# Patient Record
Sex: FEMALE | Race: WHITE | NOT HISPANIC OR LATINO | Employment: FULL TIME | URBAN - NONMETROPOLITAN AREA
[De-identification: names, ages, dates, MRNs, and addresses within clinical notes are randomized per-mention and may not be internally consistent; named-entity substitution may affect disease eponyms.]

---

## 2019-11-15 NOTE — PROGRESS NOTES
Primary Care Provider: Freya Bourne MD  Requesting Provider: Bing Villagran APRN    Chief Complaint:   Chief Complaint   Patient presents with   • Back Pain     Patient is here today for back pain and right leg pain. Patient brought in cd for Adalberto to review.     History of Present Illness  Consultation today at the request of KRYSTINA Khan    HPI:  Sherly Jones is a 54 y.o. female who presents today with a complaint of lumbar back pain.  Previous discectomy at L4-5 and 2004 plan unknown surgeon out of [place].  No recent injury.    Gradual and progressive onset of lumbar back pain for an unknown number of years that has worsened over the past 6 months.  She states her lumbar back pain is constant in nature but waxes and wanes in severity.  Her lumbar back pain alternates laterality, primarily on the right, radiates to the right hip and extends to include the anterior aspect of the right thigh and occasionally the posterior aspect of the right thigh to the knee.  She denies lower extremity weakness, numbness, or paresthesias.  Ms. Vital back pain worsens with prolonged walking and with physical activity and decreases a some extent with stretching, while lying down, application of heat and/or cold, use of a TENS unit, and with ibuprofen and Flexeril.  She does report numbness to the vaginal area but denies a change in sensation to the vagina or rectum with malia-care.  She additionally reports intermittent episodes of having to bear down to void, as well as dysuria, urgency, frequency to void, and chronic constipation.  She denies fevers, chills, night sweats, unexplained weight loss, abdominal pain, bloating, nausea, or vomiting.  She reports a past history of cancer of the cervix at age 21 that was treated with a conization.    Ms. Jones is not completed nor participated in a dedicated course of physician directed physical therapy or been evaluated by pain management.  She does  participate in routine chiropractic and massage care with Dr. Traore out of [place].    Oswestry Disability Index (Gibson et al, 1980)   Score   Pain Intensity 1   Personal Care 2   Lifting 4   Walking 2   Sitting 2   Standing 3   Sleeping 1   Sex Life (if applicable) 0   Social Life 3   Traveling 2   Previous Treatment Yes   TOTAL = Score x2  (or 2.22 if one NA) 40     SCORE INTERPRETATION OF THE OSWESTRY LBP DISABILITY QUESTIONNAIRE   20-40% Moderate disability This group experiences more pain and problems with sitting, lifting, and standing. Travel and social life are more difficult and they may well be off work. Personal care, sexual activity, and sleeping are not grossly affected, and the back condition can usually be managed by conservative means.       40-60% Severe disability Pain remains the main problem in this group of patients, but travel, personal care, social life, sexual activity, and sleep are also affected.  These patients require detailed investigation.     ROS  Review of Systems   Constitutional: Positive for activity change.   HENT: Positive for postnasal drip, sinus pressure, sneezing and tinnitus.    Eyes: Positive for visual disturbance.   Respiratory: Positive for apnea, chest tightness and shortness of breath.    Cardiovascular: Negative.    Gastrointestinal: Positive for constipation.   Endocrine: Positive for cold intolerance, heat intolerance and polyuria.   Genitourinary: Positive for decreased urine volume, difficulty urinating, dysuria, flank pain, frequency, hematuria, pelvic pain and urgency.   Musculoskeletal: Positive for arthralgias, back pain, gait problem, joint swelling, myalgias, neck pain and neck stiffness.   Skin: Negative.    Allergic/Immunologic: Positive for environmental allergies and food allergies.   Neurological: Positive for dizziness and numbness.   Hematological: Negative.    Psychiatric/Behavioral: Positive for agitation, behavioral problems, decreased  "concentration and sleep disturbance.   All other systems reviewed and are negative.    Past Medical History:   Diagnosis Date   • Cancer (CMS/HCC)     cervical cancer (31 years ago   • Overweight (BMI 25.0-29.9)    • Stroke (CMS/HCC)      Past Surgical History:   Procedure Laterality Date   • GASTRIC SLEEVE LAPAROSCOPIC     • HYSTERECTOMY     • LUMBAR DISCECTOMY  2004     Family History: family history is not on file.    Social History:  reports that she has never smoked. She has never used smokeless tobacco. She reports that she does not drink alcohol or use drugs.    Medications:    Current Outpatient Medications:   •  acyclovir (ZOVIRAX) 400 MG tablet, , Disp: , Rfl:   •  albuterol sulfate  (90 Base) MCG/ACT inhaler, , Disp: , Rfl:   •  buPROPion SR (WELLBUTRIN SR) 150 MG 12 hr tablet, bupropion HCl  mg tablet,12 hr sustained-release, Disp: , Rfl:   •  citalopram (CeleXA) 20 MG tablet, Take 20 mg by mouth Daily., Disp: , Rfl:   •  cyclobenzaprine (FLEXERIL) 10 MG tablet, Take 10 mg by mouth., Disp: , Rfl:   •  ondansetron ODT (ZOFRAN-ODT) 4 MG disintegrating tablet, DISSOLVE 1 TABLET IN MOUTH EVERY 8 TO 12 HOURS AS NEEDED, Disp: , Rfl: 1    Allergies:  Prozac  [fluoxetine hcl]    Objective   Ht 167.6 cm (66\")   Wt 77.1 kg (170 lb)   BMI 27.44 kg/m²   Physical Exam   Constitutional: She is oriented to person, place, and time. She appears well-developed and well-nourished. She is cooperative.  Non-toxic appearance. She does not have a sickly appearance. She does not appear ill. No distress.   BMI 27.4   HENT:   Head: Normocephalic and atraumatic.   Right Ear: Hearing normal.   Left Ear: Hearing normal.   Mouth/Throat: Mucous membranes are normal.   Eyes: Conjunctivae and EOM are normal. Pupils are equal, round, and reactive to light.   Neck: Trachea normal and full passive range of motion without pain. Neck supple.   Cardiovascular: Normal rate and regular rhythm.   Pulmonary/Chest: Effort normal. No " accessory muscle usage. No apnea, no tachypnea and no bradypnea. No respiratory distress.   Abdominal: Soft. Normal appearance.   Neurological: She is alert and oriented to person, place, and time. Gait normal. GCS eye subscore is 4. GCS verbal subscore is 5. GCS motor subscore is 6.   Reflex Scores:       Tricep reflexes are 3+ on the right side and 3+ on the left side.       Bicep reflexes are 3+ on the right side and 3+ on the left side.       Brachioradialis reflexes are 3+ on the right side and 3+ on the left side.       Patellar reflexes are 3+ on the right side and 3+ on the left side.       Achilles reflexes are 3+ on the right side and 3+ on the left side.  Skin: Skin is warm, dry and intact.   Psychiatric: She has a normal mood and affect. Her speech is normal and behavior is normal.   Nursing note and vitals reviewed.    Neurologic Exam     Mental Status   Oriented to person, place, and time.   Attention: normal. Concentration: normal.   Speech: speech is normal   Level of consciousness: alert    Cranial Nerves     CN II   Visual fields full to confrontation.     CN III, IV, VI   Pupils are equal, round, and reactive to light.  Extraocular motions are normal.     CN V   Facial sensation intact.     CN VII   Facial expression full, symmetric.     CN VIII   CN VIII normal.     CN IX, X   CN IX normal.     CN XI   CN XI normal.     Motor Exam   Muscle bulk: normal  Overall muscle tone: normal  Right arm tone: normal  Left arm tone: normal  Right arm pronator drift: absent  Left arm pronator drift: absent  Right leg tone: normal  Left leg tone: normal    Strength   Right deltoid: 5/5  Left deltoid: 5/5  Right biceps: 5/5  Left biceps: 5/5  Right triceps: 5/5  Left triceps: 5/5  Right wrist extension: 5/5  Left wrist extension: 5/5  Right iliopsoas: 5/5  Left iliopsoas: 5/5  Right quadriceps: 5/5  Left quadriceps: 5/5  Right anterior tibial: 5/5  Left anterior tibial: 5/5  Right posterior tibial: 5/5  Left  posterior tibial: 5/5  Positive bilateral VEENA, right> left.     Sensory Exam   Right arm light touch: normal  Left arm light touch: normal  Right leg light touch: normal  Left leg light touch: normal    Gait, Coordination, and Reflexes     Gait  Gait: normal    Tremor   Resting tremor: absent  Intention tremor: absent  Action tremor: absent    Reflexes   Right brachioradialis: 3+  Left brachioradialis: 3+  Right biceps: 3+  Left biceps: 3+  Right triceps: 3+  Left triceps: 3+  Right patellar: 3+  Left patellar: 3+  Right achilles: 3+  Left achilles: 3+  Right : 4+  Left : 4+  Right plantar: equivocal  Left plantar: equivocal  Right Perez: present  Left Perez: present  Right ankle clonus: absent  Left ankle clonus: absent  Right pendular knee jerk: absent  Left pendular knee jerk: absent    Imaging: (independent review and interpretation)  11/5/19 MRI of the lumbar spine shows no acute fractures, areas of bone marrow signal change within the vertebral bodies of the lumbar spine, no STIR signal change, the conus ends at normal level, no T2 signal change within the central cord, multiple areas of Schmorl nodes to the upper lumbar and lower thoracic spine, anteriolisthesis of L4 and L5 over S1, multilevel areas of facet arthropathy and ligamentous flavum hypertrophy resulting in central canal and bilateral foraminal narrowing at L3-4, and right foraminal stenosis at L4-5 and L5-S1.              ASSESSMENT and PLAN  Sherly Jones is a 54 y.o. female with a significant medical history of cancer of the cervix at age 21, prior L4-5 discectomy in 2014, stroke, and she is overweight.  She presents with a new problem of constant lumbar back and right thigh pain that worsens with physical activity and resolves with lying down.  Post exam, she additionally reports constant neck stiffness without complaints of upper extremity radicular pain, weakness, numbness, or paresthesias.  Physical exam findings of  global hyperreflexia with bilateral Perez's, positive bilateral VEENA, and equivocal bilateral plantar reflexes.   Her imaging shows no T2 signal change within the central cord, multiple areas of Schmorl nodes to the upper lumbar and lower thoracic spine, anteriolisthesis of L4 and L5 over S1, L5, most likely due to a pars defect, multilevel areas of facet arthropathy, ligamentous flavum hypertrophy, and mild central disc protrusions resulting in central canal and bilateral foraminal narrowing at L3-4, and right foraminal stenosis at L4-5 and L5-S1.  Imaging discussed and reviewed with patient.    TREATMENT RECOMMENDATIONS ...  Lower back and right leg pain  X-rays of the lumbar spine upright and supine  PT/OT, prescription provided to patient  Chiropractic and/or massage as tolerated  Unless contraindicated, Tylenol and ibuprofen or naproxen PRN per package instructions.  B/R/AE and use discussed.    Cervicalgia/hyperreflexia/bilateral Perez's  X-ray of the cervical spine complete with flexion extension to assess for cervical spine instability    Lower urinary tract symptoms with history of cancer of the cervix  CBC, CMP, sed rate, CRP, UA culture if indicated.  We will notify the patient of any abnormal results proceed accordingly.    Overweight  BMI today is 27.4.  Information on the DASH diet provided in the AVS.  We will continue to provided diet and exercise information with the goal of weight loss at each scheduled appointment.     Return for reassessment with me after physical therapy.    I advised the patient to call and return sooner for new or worsening complaints of weakness, paresthesias, gait disturbances, or any additional concerns.  Treatment options discussed in detail with Sherly and she voiced understanding.  Ms. Jones agrees with this plan of care.    Sherly was seen today for back pain.    Diagnoses and all orders for this visit:    Lumbar pain  -     XR Spine Lumbar 2 or 3 View  -      Ambulatory Referral to Physical Therapy Evaluate and treat (3x a week for 6 weeks); Cross Fiber, Soft Tissue Mobilizaton; Stretching, ROM, Strengthening    Right leg pain  -     Ambulatory Referral to Physical Therapy Evaluate and treat (3x a week for 6 weeks); Cross Fiber, Soft Tissue Mobilizaton; Stretching, ROM, Strengthening    Cervicalgia  -     XR spine cervical complete w flex ext; Future    Perez's reflex positive  -     XR spine cervical complete w flex ext; Future    Hyperreflexia  -     XR spine cervical complete w flex ext; Future    Lower urinary tract symptoms (LUTS)  -     CBC & Differential; Future  -     Comprehensive Metabolic Panel; Future  -     C-reactive Protein; Future  -     Sedimentation Rate  -     Urinalysis With Microscopic If Indicated (No Culture) - Urine, Clean Catch; Future    Overweight (BMI 25.0-29.9)    Return for follow up with Adalberto after pt.    Thank you for this Consultation and the opportunity to participate in Sherly's care.    Sincerely,  Adalberto Montaño, APRN    Level of Risk: Moderate due to: undiagnosed new problem  MDM: Moderate Complexity  (Mod = 50631, High = 15014)

## 2019-11-19 ENCOUNTER — OFFICE VISIT (OUTPATIENT)
Dept: NEUROSURGERY | Facility: CLINIC | Age: 54
End: 2019-11-19

## 2019-11-19 ENCOUNTER — LAB (OUTPATIENT)
Dept: LAB | Facility: HOSPITAL | Age: 54
End: 2019-11-19

## 2019-11-19 ENCOUNTER — HOSPITAL ENCOUNTER (OUTPATIENT)
Dept: GENERAL RADIOLOGY | Facility: HOSPITAL | Age: 54
Discharge: HOME | End: 2019-11-19
Admitting: NURSE PRACTITIONER

## 2019-11-19 ENCOUNTER — HOSPITAL ENCOUNTER (OUTPATIENT)
Dept: GENERAL RADIOLOGY | Facility: HOSPITAL | Age: 54
Discharge: HOME | End: 2019-11-19

## 2019-11-19 VITALS — BODY MASS INDEX: 27.32 KG/M2 | WEIGHT: 170 LBS | HEIGHT: 66 IN

## 2019-11-19 DIAGNOSIS — M79.604 RIGHT LEG PAIN: ICD-10-CM

## 2019-11-19 DIAGNOSIS — R29.2 HYPERREFLEXIA: ICD-10-CM

## 2019-11-19 DIAGNOSIS — R29.2: ICD-10-CM

## 2019-11-19 DIAGNOSIS — M54.50 LUMBAR PAIN: ICD-10-CM

## 2019-11-19 DIAGNOSIS — R39.9 LOWER URINARY TRACT SYMPTOMS (LUTS): ICD-10-CM

## 2019-11-19 DIAGNOSIS — M54.50 LUMBAR PAIN: Primary | ICD-10-CM

## 2019-11-19 DIAGNOSIS — E66.3 OVERWEIGHT (BMI 25.0-29.9): ICD-10-CM

## 2019-11-19 DIAGNOSIS — M54.2 CERVICALGIA: ICD-10-CM

## 2019-11-19 PROCEDURE — 72100 X-RAY EXAM L-S SPINE 2/3 VWS: CPT

## 2019-11-19 PROCEDURE — 85652 RBC SED RATE AUTOMATED: CPT | Performed by: NURSE PRACTITIONER

## 2019-11-19 PROCEDURE — 36415 COLL VENOUS BLD VENIPUNCTURE: CPT

## 2019-11-19 PROCEDURE — 99204 OFFICE O/P NEW MOD 45 MIN: CPT | Performed by: NURSE PRACTITIONER

## 2019-11-19 PROCEDURE — 80053 COMPREHEN METABOLIC PANEL: CPT | Performed by: NURSE PRACTITIONER

## 2019-11-19 PROCEDURE — 86140 C-REACTIVE PROTEIN: CPT | Performed by: NURSE PRACTITIONER

## 2019-11-19 PROCEDURE — 81003 URINALYSIS AUTO W/O SCOPE: CPT | Performed by: NURSE PRACTITIONER

## 2019-11-19 PROCEDURE — 85025 COMPLETE CBC W/AUTO DIFF WBC: CPT | Performed by: NURSE PRACTITIONER

## 2019-11-19 PROCEDURE — 72052 X-RAY EXAM NECK SPINE 6/>VWS: CPT

## 2019-11-19 RX ORDER — BUPROPION HYDROCHLORIDE 150 MG/1
150 TABLET, EXTENDED RELEASE ORAL 2 TIMES DAILY
COMMUNITY

## 2019-11-19 RX ORDER — ALBUTEROL SULFATE 90 UG/1
1 AEROSOL, METERED RESPIRATORY (INHALATION) AS NEEDED
COMMUNITY
Start: 2019-10-28

## 2019-11-19 RX ORDER — CYCLOBENZAPRINE HCL 10 MG
10 TABLET ORAL AS NEEDED
COMMUNITY
End: 2023-01-03

## 2019-11-19 RX ORDER — ACYCLOVIR 400 MG/1
400 TABLET ORAL DAILY
COMMUNITY
Start: 2019-10-28

## 2019-11-19 RX ORDER — ONDANSETRON 4 MG/1
4 TABLET, ORALLY DISINTEGRATING ORAL AS NEEDED
Refills: 1 | COMMUNITY
Start: 2019-11-06 | End: 2023-01-03

## 2019-11-19 RX ORDER — CITALOPRAM 20 MG/1
20 TABLET ORAL DAILY
COMMUNITY
End: 2020-04-29 | Stop reason: SDUPTHER

## 2019-11-19 NOTE — PATIENT INSTRUCTIONS
"DASH Eating Plan  DASH stands for \"Dietary Approaches to Stop Hypertension.\" The DASH eating plan is a healthy eating plan that has been shown to reduce high blood pressure (hypertension). It may also reduce your risk for type 2 diabetes, heart disease, and stroke. The DASH eating plan may also help with weight loss.  What are tips for following this plan?    General guidelines  · Avoid eating more than 2,300 mg (milligrams) of salt (sodium) a day. If you have hypertension, you may need to reduce your sodium intake to 1,500 mg a day.  · Limit alcohol intake to no more than 1 drink a day for nonpregnant women and 2 drinks a day for men. One drink equals 12 oz of beer, 5 oz of wine, or 1½ oz of hard liquor.  · Work with your health care provider to maintain a healthy body weight or to lose weight. Ask what an ideal weight is for you.  · Get at least 30 minutes of exercise that causes your heart to beat faster (aerobic exercise) most days of the week. Activities may include walking, swimming, or biking.  · Work with your health care provider or diet and nutrition specialist (dietitian) to adjust your eating plan to your individual calorie needs.  Reading food labels    · Check food labels for the amount of sodium per serving. Choose foods with less than 5 percent of the Daily Value of sodium. Generally, foods with less than 300 mg of sodium per serving fit into this eating plan.  · To find whole grains, look for the word \"whole\" as the first word in the ingredient list.  Shopping  · Buy products labeled as \"low-sodium\" or \"no salt added.\"  · Buy fresh foods. Avoid canned foods and premade or frozen meals.  Cooking  · Avoid adding salt when cooking. Use salt-free seasonings or herbs instead of table salt or sea salt. Check with your health care provider or pharmacist before using salt substitutes.  · Do not rodriguez foods. Cook foods using healthy methods such as baking, boiling, grilling, and broiling instead.  · Cook with " heart-healthy oils, such as olive, canola, soybean, or sunflower oil.  Meal planning  · Eat a balanced diet that includes:  ? 5 or more servings of fruits and vegetables each day. At each meal, try to fill half of your plate with fruits and vegetables.  ? Up to 6-8 servings of whole grains each day.  ? Less than 6 oz of lean meat, poultry, or fish each day. A 3-oz serving of meat is about the same size as a deck of cards. One egg equals 1 oz.  ? 2 servings of low-fat dairy each day.  ? A serving of nuts, seeds, or beans 5 times each week.  ? Heart-healthy fats. Healthy fats called Omega-3 fatty acids are found in foods such as flaxseeds and coldwater fish, like sardines, salmon, and mackerel.  · Limit how much you eat of the following:  ? Canned or prepackaged foods.  ? Food that is high in trans fat, such as fried foods.  ? Food that is high in saturated fat, such as fatty meat.  ? Sweets, desserts, sugary drinks, and other foods with added sugar.  ? Full-fat dairy products.  · Do not salt foods before eating.  · Try to eat at least 2 vegetarian meals each week.  · Eat more home-cooked food and less restaurant, buffet, and fast food.  · When eating at a restaurant, ask that your food be prepared with less salt or no salt, if possible.  What foods are recommended?  The items listed may not be a complete list. Talk with your dietitian about what dietary choices are best for you.  Grains  Whole-grain or whole-wheat bread. Whole-grain or whole-wheat pasta. Brown rice. Oatmeal. Quinoa. Bulgur. Whole-grain and low-sodium cereals. Kate bread. Low-fat, low-sodium crackers. Whole-wheat flour tortillas.  Vegetables  Fresh or frozen vegetables (raw, steamed, roasted, or grilled). Low-sodium or reduced-sodium tomato and vegetable juice. Low-sodium or reduced-sodium tomato sauce and tomato paste. Low-sodium or reduced-sodium canned vegetables.  Fruits  All fresh, dried, or frozen fruit. Canned fruit in natural juice (without  added sugar).  Meat and other protein foods  Skinless chicken or turkey. Ground chicken or turkey. Pork with fat trimmed off. Fish and seafood. Egg whites. Dried beans, peas, or lentils. Unsalted nuts, nut butters, and seeds. Unsalted canned beans. Lean cuts of beef with fat trimmed off. Low-sodium, lean deli meat.  Dairy  Low-fat (1%) or fat-free (skim) milk. Fat-free, low-fat, or reduced-fat cheeses. Nonfat, low-sodium ricotta or cottage cheese. Low-fat or nonfat yogurt. Low-fat, low-sodium cheese.  Fats and oils  Soft margarine without trans fats. Vegetable oil. Low-fat, reduced-fat, or light mayonnaise and salad dressings (reduced-sodium). Canola, safflower, olive, soybean, and sunflower oils. Avocado.  Seasoning and other foods  Herbs. Spices. Seasoning mixes without salt. Unsalted popcorn and pretzels. Fat-free sweets.  What foods are not recommended?  The items listed may not be a complete list. Talk with your dietitian about what dietary choices are best for you.  Grains  Baked goods made with fat, such as croissants, muffins, or some breads. Dry pasta or rice meal packs.  Vegetables  Creamed or fried vegetables. Vegetables in a cheese sauce. Regular canned vegetables (not low-sodium or reduced-sodium). Regular canned tomato sauce and paste (not low-sodium or reduced-sodium). Regular tomato and vegetable juice (not low-sodium or reduced-sodium). Pickles. Olives.  Fruits  Canned fruit in a light or heavy syrup. Fried fruit. Fruit in cream or butter sauce.  Meat and other protein foods  Fatty cuts of meat. Ribs. Fried meat. Harris. Sausage. Bologna and other processed lunch meats. Salami. Fatback. Hotdogs. Bratwurst. Salted nuts and seeds. Canned beans with added salt. Canned or smoked fish. Whole eggs or egg yolks. Chicken or turkey with skin.  Dairy  Whole or 2% milk, cream, and half-and-half. Whole or full-fat cream cheese. Whole-fat or sweetened yogurt. Full-fat cheese. Nondairy creamers. Whipped toppings.  Processed cheese and cheese spreads.  Fats and oils  Butter. Stick margarine. Lard. Shortening. Ghee. Harris fat. Tropical oils, such as coconut, palm kernel, or palm oil.  Seasoning and other foods  Salted popcorn and pretzels. Onion salt, garlic salt, seasoned salt, table salt, and sea salt. Worcestershire sauce. Tartar sauce. Barbecue sauce. Teriyaki sauce. Soy sauce, including reduced-sodium. Steak sauce. Canned and packaged gravies. Fish sauce. Oyster sauce. Cocktail sauce. Horseradish that you find on the shelf. Ketchup. Mustard. Meat flavorings and tenderizers. Bouillon cubes. Hot sauce and Tabasco sauce. Premade or packaged marinades. Premade or packaged taco seasonings. Relishes. Regular salad dressings.  Where to find more information:  · National Heart, Lung, and Blood [place]: www.nhlbi.nih.gov  · American Heart Association: www.heart.org  Summary  · The DASH eating plan is a healthy eating plan that has been shown to reduce high blood pressure (hypertension). It may also reduce your risk for type 2 diabetes, heart disease, and stroke.  · With the DASH eating plan, you should limit salt (sodium) intake to 2,300 mg a day. If you have hypertension, you may need to reduce your sodium intake to 1,500 mg a day.  · When on the DASH eating plan, aim to eat more fresh fruits and vegetables, whole grains, lean proteins, low-fat dairy, and heart-healthy fats.  · Work with your health care provider or diet and nutrition specialist (dietitian) to adjust your eating plan to your individual calorie needs.  This information is not intended to replace advice given to you by your health care provider. Make sure you discuss any questions you have with your health care provider.  Document Released: 12/06/2012 Document Revised: 12/11/2017 Document Reviewed: 12/11/2017  [employer] Patient Education © 2019 [employer]

## 2019-11-20 LAB
ALBUMIN SERPL-MCNC: 4.4 G/DL (ref 3.5–5.2)
ALBUMIN/GLOB SERPL: 1.4 G/DL
ALP SERPL-CCNC: 69 U/L (ref 39–117)
ALT SERPL W P-5'-P-CCNC: 21 U/L (ref 1–33)
ANION GAP SERPL CALCULATED.3IONS-SCNC: 12.8 MMOL/L (ref 5–15)
AST SERPL-CCNC: 19 U/L (ref 1–32)
BASOPHILS # BLD AUTO: 0.06 10*3/MM3 (ref 0–0.2)
BASOPHILS NFR BLD AUTO: 0.6 % (ref 0–1.5)
BILIRUB SERPL-MCNC: 0.7 MG/DL (ref 0.2–1.2)
BILIRUB UR QL STRIP: NEGATIVE
BUN BLD-MCNC: 15 MG/DL (ref 6–20)
BUN/CREAT SERPL: 18.8 (ref 7–25)
CALCIUM SPEC-SCNC: 9.9 MG/DL (ref 8.6–10.5)
CHLORIDE SERPL-SCNC: 103 MMOL/L (ref 98–107)
CLARITY UR: CLEAR
CO2 SERPL-SCNC: 28.2 MMOL/L (ref 22–29)
COLOR UR: YELLOW
CREAT BLD-MCNC: 0.8 MG/DL (ref 0.57–1)
CRP SERPL-MCNC: 0.04 MG/DL (ref 0–0.5)
DEPRECATED RDW RBC AUTO: 43.7 FL (ref 37–54)
EOSINOPHIL # BLD AUTO: 0.28 10*3/MM3 (ref 0–0.4)
EOSINOPHIL NFR BLD AUTO: 2.7 % (ref 0.3–6.2)
ERYTHROCYTE [DISTWIDTH] IN BLOOD BY AUTOMATED COUNT: 13.1 % (ref 12.3–15.4)
ERYTHROCYTE [SEDIMENTATION RATE] IN BLOOD: 4 MM/HR (ref 0–30)
GFR SERPL CREATININE-BSD FRML MDRD: 75 ML/MIN/1.73
GLOBULIN UR ELPH-MCNC: 3.2 GM/DL
GLUCOSE BLD-MCNC: 104 MG/DL (ref 65–99)
GLUCOSE UR STRIP-MCNC: NEGATIVE MG/DL
HCT VFR BLD AUTO: 42.9 % (ref 34–46.6)
HGB BLD-MCNC: 14.3 G/DL (ref 12–15.9)
HGB UR QL STRIP.AUTO: NEGATIVE
IMM GRANULOCYTES # BLD AUTO: 0.04 10*3/MM3 (ref 0–0.05)
IMM GRANULOCYTES NFR BLD AUTO: 0.4 % (ref 0–0.5)
KETONES UR QL STRIP: NEGATIVE
LEUKOCYTE ESTERASE UR QL STRIP.AUTO: NEGATIVE
LYMPHOCYTES # BLD AUTO: 3.25 10*3/MM3 (ref 0.7–3.1)
LYMPHOCYTES NFR BLD AUTO: 30.9 % (ref 19.6–45.3)
MCH RBC QN AUTO: 30 PG (ref 26.6–33)
MCHC RBC AUTO-ENTMCNC: 33.3 G/DL (ref 31.5–35.7)
MCV RBC AUTO: 89.9 FL (ref 79–97)
MONOCYTES # BLD AUTO: 0.72 10*3/MM3 (ref 0.1–0.9)
MONOCYTES NFR BLD AUTO: 6.9 % (ref 5–12)
NEUTROPHILS # BLD AUTO: 6.16 10*3/MM3 (ref 1.7–7)
NEUTROPHILS NFR BLD AUTO: 58.5 % (ref 42.7–76)
NITRITE UR QL STRIP: NEGATIVE
NRBC BLD AUTO-RTO: 0 /100 WBC (ref 0–0.2)
PH UR STRIP.AUTO: <=5 [PH] (ref 5–8)
PLATELET # BLD AUTO: 267 10*3/MM3 (ref 140–450)
PMV BLD AUTO: 10.3 FL (ref 6–12)
POTASSIUM BLD-SCNC: 4.1 MMOL/L (ref 3.5–5.2)
PROT SERPL-MCNC: 7.6 G/DL (ref 6–8.5)
PROT UR QL STRIP: ABNORMAL
RBC # BLD AUTO: 4.77 10*6/MM3 (ref 3.77–5.28)
SODIUM BLD-SCNC: 144 MMOL/L (ref 136–145)
SP GR UR STRIP: 1.02 (ref 1–1.03)
UROBILINOGEN UR QL STRIP: ABNORMAL
WBC NRBC COR # BLD: 10.51 10*3/MM3 (ref 3.4–10.8)

## 2020-02-13 ENCOUNTER — OFFICE VISIT (OUTPATIENT)
Dept: NEUROSURGERY | Facility: CLINIC | Age: 55
End: 2020-02-13

## 2020-02-13 VITALS — WEIGHT: 170 LBS | BODY MASS INDEX: 27.32 KG/M2 | HEIGHT: 66 IN

## 2020-02-13 DIAGNOSIS — R29.2: ICD-10-CM

## 2020-02-13 DIAGNOSIS — E66.3 OVERWEIGHT (BMI 25.0-29.9): ICD-10-CM

## 2020-02-13 DIAGNOSIS — M54.2 CERVICALGIA: Primary | ICD-10-CM

## 2020-02-13 DIAGNOSIS — R29.2 HYPERREFLEXIA: ICD-10-CM

## 2020-02-13 DIAGNOSIS — M54.50 LUMBAR PAIN: ICD-10-CM

## 2020-02-13 PROCEDURE — 99213 OFFICE O/P EST LOW 20 MIN: CPT | Performed by: NURSE PRACTITIONER

## 2020-02-13 RX ORDER — ACYCLOVIR 400 MG/1
TABLET ORAL
COMMUNITY
End: 2020-04-29 | Stop reason: SDUPTHER

## 2020-02-13 RX ORDER — ALBUTEROL SULFATE 90 UG/1
AEROSOL, METERED RESPIRATORY (INHALATION)
COMMUNITY
Start: 2012-12-06 | End: 2020-04-29 | Stop reason: SDUPTHER

## 2020-02-13 RX ORDER — CITALOPRAM 40 MG/1
40 TABLET ORAL DAILY
COMMUNITY

## 2020-02-13 RX ORDER — CYCLOBENZAPRINE HCL 10 MG
TABLET ORAL
COMMUNITY
Start: 2012-11-27 | End: 2020-04-29 | Stop reason: SDUPTHER

## 2020-02-13 RX ORDER — FEXOFENADINE HCL AND PSEUDOEPHEDRINE HCI 60; 120 MG/1; MG/1
1 TABLET, EXTENDED RELEASE ORAL AS NEEDED
COMMUNITY
Start: 2014-02-01 | End: 2023-01-03

## 2020-02-13 RX ORDER — BUPROPION HYDROCHLORIDE 150 MG/1
TABLET, EXTENDED RELEASE ORAL
COMMUNITY
Start: 2013-07-08 | End: 2020-04-29 | Stop reason: SDUPTHER

## 2020-02-13 NOTE — PROGRESS NOTES
Chief complaint:   Chief Complaint   Patient presents with   • Back Pain     Patient is here toay for back pain and neck pain.     Subjective     HPI:   From previous note: 11/19/19.  Sherly Jones is a 54 y.o. female with a significant medical history of cancer of the cervix at age 21, prior L4-5 discectomy in 2014, stroke, and she is overweight.  She presents with a new problem of constant lumbar back and right thigh pain that worsens with physical activity and resolves with lying down.  Post exam, she additionally reports constant neck stiffness without complaints of upper extremity radicular pain, weakness, numbness, or paresthesias.  Physical exam findings of global hyperreflexia with bilateral Perez's, positive bilateral VEENA, and equivocal bilateral plantar reflexes.   Her imaging shows no T2 signal change within the central cord, multiple areas of Schmorl nodes to the upper lumbar and lower thoracic spine, anteriolisthesis of L4 and L5 over S1, L5, most likely due to a pars defect, multilevel areas of facet arthropathy, ligamentous flavum hypertrophy, and mild central disc protrusions resulting in central canal and bilateral foraminal narrowing at L3-4, and right foraminal stenosis at L4-5 and L5-S1.  Imaging discussed and reviewed with patient.     TREATMENT RECOMMENDATIONS ...  Lower back and right leg pain  X-rays of the lumbar spine upright and supine  PT/OT, prescription provided to patient  Chiropractic and/or massage as tolerated  Unless contraindicated, Tylenol and ibuprofen or naproxen PRN per package instructions.  B/R/AE and use discussed.     Cervicalgia/hyperreflexia/bilateral Perez's  X-ray of the cervical spine complete with flexion extension to assess for cervical spine instability     Lower urinary tract symptoms with history of cancer of the cervix  CBC, CMP, sed rate, CRP, UA culture if indicated.  We will notify the patient of any abnormal results proceed  "accordingly.     Overweight  BMI today is 27.4.  Information on the DASH diet provided in the AVS.  We will continue to provided diet and exercise information with the goal of weight loss at each scheduled appointment.     Interval History: Sherly Jones is a 54 y.o.  female who presents today for for follow-up of neck and lumbar back pain.  Ms. Jones recently completed a dedicated course of physician directed physical therapy February 10, 2020 which she reports has been beneficial in treating her discomfort.    She currently reports constant non-radiating cervical \"tension\".  She denies upper extremity radicular pain, numbness, or paresthesias.  She does however report generalized upper extremity weakness and frequently dropped items.  She states her neck discomfort worsens during times of stress and decreases to some extent with stretching.    In regards to her lumbar back pain, she states her back discomfort has significantly improved with physical therapy.  She continues to report intermittent mid lower lumbar pain that radiates to the posterior hips bilaterally.  She currently denies lower extremity radicular pain, weakness, numbness, or paresthesias.  She states her back pain worsens with walking, twisting, and with physical exertion.  Alleviating factors include rest and stretching.  She denies fevers, chills, night sweats, unexplained weight loss, saddle anesthesia, or bowel or bladder dysfunction.  Currently, she rates the severity of her symptoms 0/10.  No additional concerns at this time.    Oswestry Disability Index ([place] et al, 1980)   Score   Pain Intensity 2   Personal Care 0   Lifting 2   Reading 2   Headaches 2   Concentration 4   Work 1   Driving 2   Sleeping 1   Recreation 1   TOTAL =  17     SCORE INTERPRETATION OF THE OSWESTRY NECK DISABILITY QUESTIONNAIRE  0-4: No disability  5-14: Mild disability   15-24: Moderate disability   25-34: Severe disability   >35: Complete " disability    Oswestry Disability Index ([place] et al, 1980)   Score   Pain Intensity 0   Personal Care 0   Lifting 2   Walking 1   Sitting 1   Standing 2   Sleeping 0   Sex Life (if applicable) 0   Social Life 2   Traveling 0   Previous Treatment Yes   TOTAL = Score x2  (or 2.22 if one NA) 16     SCORE INTERPRETATION OF THE OSWESTRY LBP DISABILITY QUESTIONNAIRE   0-20% Minimal disability Can cope with most ADLs. Usually no treatment is needed, apart from advice on lifting, sitting, posture, physical fitness, and diet. In this group, some patients have particular difficulty with sitting and this may be important if their occupation is sedentary (, , etc.)       ROS  Review of Systems   HENT: Negative.    Eyes: Negative.    Respiratory: Negative.    Cardiovascular: Negative.    Gastrointestinal: Negative.    Endocrine: Negative.    Genitourinary: Negative.    Musculoskeletal: Positive for back pain, neck pain and neck stiffness.   Skin: Negative.    Allergic/Immunologic: Negative.    Neurological: Positive for weakness.   Hematological: Negative.    Psychiatric/Behavioral: Negative.    All other systems reviewed and are negative.    PFSH:  Past Medical History:   Diagnosis Date   • Cancer (CMS/HCC)     cervical cancer (31 years ago   • Overweight (BMI 25.0-29.9)    • Stroke (CMS/HCC)      Past Surgical History:   Procedure Laterality Date   • GASTRIC SLEEVE LAPAROSCOPIC     • HYSTERECTOMY     • LUMBAR DISCECTOMY  2004     Objective      Current Outpatient Medications   Medication Sig Dispense Refill   • acyclovir (ZOVIRAX) 400 MG tablet      • albuterol sulfate HFA (PROAIR HFA) 108 (90 Base) MCG/ACT inhaler ProAir HFA 90 mcg/actuation aerosol inhaler     • buPROPion SR (WELLBUTRIN SR) 150 MG 12 hr tablet bupropion HCl  mg tablet,12 hr sustained-release     • citalopram (CeleXA) 40 MG tablet citalopram 40 mg tablet     • cyclobenzaprine (FLEXERIL) 10 MG tablet Take 10 mg by mouth.     •  "cyclobenzaprine (FLEXERIL) 10 MG tablet TAKE 1 TABLET BY MOUTH THREE TIMES DAILY AS NEEDED FOR MUSCLE SPASMS     • fexofenadine-pseudoephedrine (ALLEGRA-D)  MG per 12 hr tablet TAKE ONE TABLET BY MOUTH TWICE DAILY     • ondansetron ODT (ZOFRAN-ODT) 4 MG disintegrating tablet DISSOLVE 1 TABLET IN MOUTH EVERY 8 TO 12 HOURS AS NEEDED  1   • acyclovir (ZOVIRAX) 400 MG tablet acyclovir 400 mg tablet     • albuterol sulfate  (90 Base) MCG/ACT inhaler      • buPROPion SR (WELLBUTRIN SR) 150 MG 12 hr tablet bupropion HCl  mg tablet,12 hr sustained-release     • citalopram (CeleXA) 20 MG tablet Take 20 mg by mouth Daily.       No current facility-administered medications for this visit.      Vital Signs  Ht 167.6 cm (66\")   Wt 77.1 kg (170 lb)   BMI 27.44 kg/m²   Physical Exam   Constitutional: She is oriented to person, place, and time. She appears well-developed and well-nourished. She is cooperative.  Non-toxic appearance. She does not have a sickly appearance. She does not appear ill. No distress.   BMI 27.4   HENT:   Head: Normocephalic and atraumatic.   Right Ear: Hearing normal.   Left Ear: Hearing normal.   Mouth/Throat: Mucous membranes are normal.   Eyes: Pupils are equal, round, and reactive to light. Conjunctivae and EOM are normal.   Neck: Trachea normal and full passive range of motion without pain. Neck supple.   Cardiovascular: Normal rate and regular rhythm.   Pulmonary/Chest: Effort normal. No accessory muscle usage. No apnea, no tachypnea and no bradypnea. No respiratory distress.   Abdominal: Soft. Normal appearance.   Neurological: She is alert and oriented to person, place, and time. Gait normal. GCS eye subscore is 4. GCS verbal subscore is 5. GCS motor subscore is 6.   Reflex Scores:       Tricep reflexes are 3+ on the right side and 3+ on the left side.       Bicep reflexes are 3+ on the right side and 3+ on the left side.       Brachioradialis reflexes are 3+ on the right side " and 3+ on the left side.       Patellar reflexes are 3+ on the right side and 3+ on the left side.       Achilles reflexes are 3+ on the right side and 3+ on the left side.  Skin: Skin is warm, dry and intact.   Psychiatric: She has a normal mood and affect. Her speech is normal and behavior is normal.   Nursing note and vitals reviewed.    Neurologic Exam     Mental Status   Oriented to person, place, and time.   Attention: normal. Concentration: normal.   Speech: speech is normal   Level of consciousness: alert    Cranial Nerves     CN II   Visual fields full to confrontation.     CN III, IV, VI   Pupils are equal, round, and reactive to light.  Extraocular motions are normal.     CN V   Facial sensation intact.     CN VII   Facial expression full, symmetric.     CN VIII   CN VIII normal.     CN IX, X   CN IX normal.     CN XI   CN XI normal.     Motor Exam   Muscle bulk: normal  Overall muscle tone: normal  Right arm tone: normal  Left arm tone: normal  Right arm pronator drift: absent  Left arm pronator drift: absent  Right leg tone: normal  Left leg tone: normal    Strength   Right deltoid: 5/5  Left deltoid: 5/5  Right biceps: 5/5  Left biceps: 5/5  Right triceps: 5/5  Left triceps: 5/5  Right wrist extension: 5/5  Left wrist extension: 5/5  Right iliopsoas: 5/5  Left iliopsoas: 5/5  Right quadriceps: 5/5  Left quadriceps: 5/5  Right anterior tibial: 5/5  Left anterior tibial: 5/5  Right posterior tibial: 5/5  Left posterior tibial: 5/5    Sensory Exam   Right arm light touch: normal  Left arm light touch: normal  Right leg light touch: normal  Left leg light touch: normal    Gait, Coordination, and Reflexes     Gait  Gait: normal    Tremor   Resting tremor: absent  Intention tremor: absent  Action tremor: absent    Reflexes   Right brachioradialis: 3+  Left brachioradialis: 3+  Right biceps: 3+  Left biceps: 3+  Right triceps: 3+  Left triceps: 3+  Right patellar: 3+  Left patellar: 3+  Right achilles:  3+  Left achilles: 3+  Right plantar: equivocal  Left plantar: equivocal  Right Perez: present  Left Perez: present  Right ankle clonus: absent  Left ankle clonus: absent  Right pendular knee jerk: absent  Left pendular knee jerk: absent  (12 bullet pts)    Male  strength (pounds)  AGE Right Hand RH Norms Left Hand LH Norms   20-24  121+20.6  104+21.8   25-29  120+23.0  110+16.2   30-34  121+22.4  110+21.7   35-39  119+24  113+21.7   40-44  117+20.7  112+18.7   45-49  110+23.0  101+22.8   50-54 60* 113+18.1 50 102+17   55-59  101+26.7  83+23.4   60-64  90+20.4  77+20.3   65-69  91+20.6  76.8+19.8   70-74  75+21.5  65+18.1   75+  66+21.0  55+17.0   (DALE Michele et al; Hand Dynometer: Effects of trials and sessions.  Perpetual and Motor Skills 61:195-8, 1985)    Results Review:   11/19/19 x-ray of the cervical spine shows no acute fractures, straightening of the normal cervical lordosis, anteriolisthesis of C3 over C4 that resolves with extension, retrolisthesis of C5 over C6 that appears to resolve with extension.        11/5/19 MRI of the lumbar spine shows no acute fractures, areas of bone marrow signal change within the vertebral bodies of the lumbar spine, no STIR signal change, the conus ends at normal level, no T2 signal change within the central cord, multiple areas of Schmorl nodes to the upper lumbar and lower thoracic spine, anteriolisthesis of L4 over L5 and L5 over S1, multilevel areas of facet arthropathy and ligamentous flavum hypertrophy resulting in central canal and bilateral foraminal narrowing at L3-4, and right foraminal stenosis at L4-5 and L5-S1.           11/19/19        Assessment/Plan: Sherly Jones is a 54 y.o. female with a significant medical history of cancer of the cervix at age 21, prior L4-5 discectomy in 2014, stroke, and she is overweight.  She presents today for follow-up of both neck and lumbar back pain without complaints of significant upper or lower extremity radicular  pain, numbness, or paresthesias.  Physical exam findings of bilateral   strength weakness, slightly worse on the left, 3-4 standard deviations below age mean; global hyperreflexia with bilateral Perez's; and equivocal bilateral plantar reflexes.  BIGG: Neck 17; back 16.  Her imaging: x-ray of the cervical spine shows no acute fractures, straightening of the normal cervical lordosis, anteriolisthesis of C3 over C4 that resolves with extension, retrolisthesis of C5 over C6 that appears to resolve with extension.  MRI of the lumbar spine shows an anteriolisthesis of L4 over L5 and L5 over S1, multilevel areas of facet arthropathy and ligamentous flavum hypertrophy resulting in central canal and bilateral foraminal narrowing at L3-4, and right foraminal stenosis at L4-5 and L5-S1.    1. Cervicalgia    2. Perez's reflex positive    3. Hyperreflexia    4. Lumbar pain    5. Overweight (BMI 25.0-29.9)      Recommendations:  Cervicalgia  Hyperreflexia  Bilateral Perez's  Per Ms. Jones's request, we will continue PT/OT, prescription provided to patient  Chiropractic and/or massage as tolerated  Unless contraindicated, Tylenol and ibuprofen or naproxen PRN per package instructions.  B/R/AE and use discussed.  MRI cervical spine for further evaluation of hyperreflexia and to rule out need for surgical surgical intervention.    Lumbar back pain  Chiropractic and/or massage as tolerated  Unless contraindicated, Tylenol and ibuprofen or naproxen PRN per package instructions.  B/R/AE and use discussed.     Lower urinary tract symptoms with history of cancer of the cervix  Lower urinary tract symptoms have resolved.  Labs: CBC, CMP, sed rate, CRP, and UA  were unremarkable.     Overweight: 25.0-29.9kg/m2   Body mass index is 27.44 kg/m².  Information on the DASH diet provided in the AVS.  We will continue to provided diet and exercise information with the goal of weight loss at each scheduled appointment.     Sherly was  seen today for back pain.    Diagnoses and all orders for this visit:    Cervicalgia  -     MRI Cervical Spine Without Contrast; Future  -     Ambulatory Referral to Physical Therapy Evaluate and treat (3x a week for 6 weeks); Cross Fiber, Soft Tissue Mobilizaton; ROM, Strengthening, Stretching    Perez's reflex positive    Hyperreflexia    Lumbar pain    Overweight (BMI 25.0-29.9)      Return for Follow up with Dr Alavrado next available.    Level of Risk: Moderate due to: undiagnosed new problem  MDM: Moderate Complexity  (Mod = 11760, High = 33112)    Thank you, for allowing me to continue to participate in the care of this patient.    Sincerely,  KRYSTINA Kern

## 2020-02-13 NOTE — PATIENT INSTRUCTIONS
"DASH Eating Plan  DASH stands for \"Dietary Approaches to Stop Hypertension.\" The DASH eating plan is a healthy eating plan that has been shown to reduce high blood pressure (hypertension). It may also reduce your risk for type 2 diabetes, heart disease, and stroke. The DASH eating plan may also help with weight loss.  What are tips for following this plan?    General guidelines  · Avoid eating more than 2,300 mg (milligrams) of salt (sodium) a day. If you have hypertension, you may need to reduce your sodium intake to 1,500 mg a day.  · Limit alcohol intake to no more than 1 drink a day for nonpregnant women and 2 drinks a day for men. One drink equals 12 oz of beer, 5 oz of wine, or 1½ oz of hard liquor.  · Work with your health care provider to maintain a healthy body weight or to lose weight. Ask what an ideal weight is for you.  · Get at least 30 minutes of exercise that causes your heart to beat faster (aerobic exercise) most days of the week. Activities may include walking, swimming, or biking.  · Work with your health care provider or diet and nutrition specialist (dietitian) to adjust your eating plan to your individual calorie needs.  Reading food labels    · Check food labels for the amount of sodium per serving. Choose foods with less than 5 percent of the Daily Value of sodium. Generally, foods with less than 300 mg of sodium per serving fit into this eating plan.  · To find whole grains, look for the word \"whole\" as the first word in the ingredient list.  Shopping  · Buy products labeled as \"low-sodium\" or \"no salt added.\"  · Buy fresh foods. Avoid canned foods and premade or frozen meals.  Cooking  · Avoid adding salt when cooking. Use salt-free seasonings or herbs instead of table salt or sea salt. Check with your health care provider or pharmacist before using salt substitutes.  · Do not rodriguez foods. Cook foods using healthy methods such as baking, boiling, grilling, and broiling instead.  · Cook with " heart-healthy oils, such as olive, canola, soybean, or sunflower oil.  Meal planning  · Eat a balanced diet that includes:  ? 5 or more servings of fruits and vegetables each day. At each meal, try to fill half of your plate with fruits and vegetables.  ? Up to 6-8 servings of whole grains each day.  ? Less than 6 oz of lean meat, poultry, or fish each day. A 3-oz serving of meat is about the same size as a deck of cards. One egg equals 1 oz.  ? 2 servings of low-fat dairy each day.  ? A serving of nuts, seeds, or beans 5 times each week.  ? Heart-healthy fats. Healthy fats called Omega-3 fatty acids are found in foods such as flaxseeds and coldwater fish, like sardines, salmon, and mackerel.  · Limit how much you eat of the following:  ? Canned or prepackaged foods.  ? Food that is high in trans fat, such as fried foods.  ? Food that is high in saturated fat, such as fatty meat.  ? Sweets, desserts, sugary drinks, and other foods with added sugar.  ? Full-fat dairy products.  · Do not salt foods before eating.  · Try to eat at least 2 vegetarian meals each week.  · Eat more home-cooked food and less restaurant, buffet, and fast food.  · When eating at a restaurant, ask that your food be prepared with less salt or no salt, if possible.  What foods are recommended?  The items listed may not be a complete list. Talk with your dietitian about what dietary choices are best for you.  Grains  Whole-grain or whole-wheat bread. Whole-grain or whole-wheat pasta. Brown rice. Oatmeal. Quinoa. Bulgur. Whole-grain and low-sodium cereals. Kate bread. Low-fat, low-sodium crackers. Whole-wheat flour tortillas.  Vegetables  Fresh or frozen vegetables (raw, steamed, roasted, or grilled). Low-sodium or reduced-sodium tomato and vegetable juice. Low-sodium or reduced-sodium tomato sauce and tomato paste. Low-sodium or reduced-sodium canned vegetables.  Fruits  All fresh, dried, or frozen fruit. Canned fruit in natural juice (without  added sugar).  Meat and other protein foods  Skinless chicken or turkey. Ground chicken or turkey. Pork with fat trimmed off. Fish and seafood. Egg whites. Dried beans, peas, or lentils. Unsalted nuts, nut butters, and seeds. Unsalted canned beans. Lean cuts of beef with fat trimmed off. Low-sodium, lean deli meat.  Dairy  Low-fat (1%) or fat-free (skim) milk. Fat-free, low-fat, or reduced-fat cheeses. Nonfat, low-sodium ricotta or cottage cheese. Low-fat or nonfat yogurt. Low-fat, low-sodium cheese.  Fats and oils  Soft margarine without trans fats. Vegetable oil. Low-fat, reduced-fat, or light mayonnaise and salad dressings (reduced-sodium). Canola, safflower, olive, soybean, and sunflower oils. Avocado.  Seasoning and other foods  Herbs. Spices. Seasoning mixes without salt. Unsalted popcorn and pretzels. Fat-free sweets.  What foods are not recommended?  The items listed may not be a complete list. Talk with your dietitian about what dietary choices are best for you.  Grains  Baked goods made with fat, such as croissants, muffins, or some breads. Dry pasta or rice meal packs.  Vegetables  Creamed or fried vegetables. Vegetables in a cheese sauce. Regular canned vegetables (not low-sodium or reduced-sodium). Regular canned tomato sauce and paste (not low-sodium or reduced-sodium). Regular tomato and vegetable juice (not low-sodium or reduced-sodium). Pickles. Olives.  Fruits  Canned fruit in a light or heavy syrup. Fried fruit. Fruit in cream or butter sauce.  Meat and other protein foods  Fatty cuts of meat. Ribs. Fried meat. Harris. Sausage. Bologna and other processed lunch meats. Salami. Fatback. Hotdogs. Bratwurst. Salted nuts and seeds. Canned beans with added salt. Canned or smoked fish. Whole eggs or egg yolks. Chicken or turkey with skin.  Dairy  Whole or 2% milk, cream, and half-and-half. Whole or full-fat cream cheese. Whole-fat or sweetened yogurt. Full-fat cheese. Nondairy creamers. Whipped toppings.  Processed cheese and cheese spreads.  Fats and oils  Butter. Stick margarine. Lard. Shortening. Ghee. Harris fat. Tropical oils, such as coconut, palm kernel, or palm oil.  Seasoning and other foods  Salted popcorn and pretzels. Onion salt, garlic salt, seasoned salt, table salt, and sea salt. Worcestershire sauce. Tartar sauce. Barbecue sauce. Teriyaki sauce. Soy sauce, including reduced-sodium. Steak sauce. Canned and packaged gravies. Fish sauce. Oyster sauce. Cocktail sauce. Horseradish that you find on the shelf. Ketchup. Mustard. Meat flavorings and tenderizers. Bouillon cubes. Hot sauce and Tabasco sauce. Premade or packaged marinades. Premade or packaged taco seasonings. Relishes. Regular salad dressings.  Where to find more information:  · National Heart, Lung, and Blood [place]: www.nhlbi.nih.gov  · American Heart Association: www.heart.org  Summary  · The DASH eating plan is a healthy eating plan that has been shown to reduce high blood pressure (hypertension). It may also reduce your risk for type 2 diabetes, heart disease, and stroke.  · With the DASH eating plan, you should limit salt (sodium) intake to 2,300 mg a day. If you have hypertension, you may need to reduce your sodium intake to 1,500 mg a day.  · When on the DASH eating plan, aim to eat more fresh fruits and vegetables, whole grains, lean proteins, low-fat dairy, and heart-healthy fats.  · Work with your health care provider or diet and nutrition specialist (dietitian) to adjust your eating plan to your individual calorie needs.  This information is not intended to replace advice given to you by your health care provider. Make sure you discuss any questions you have with your health care provider.  Document Released: 12/06/2012 Document Revised: 12/11/2017 Document Reviewed: 12/11/2017  [employer] Patient Education © 2019 [employer]      Smoking Tobacco Information, Adult  Smoking tobacco can be harmful to your health. Tobacco  contains a poisonous (toxic), colorless chemical called nicotine. Nicotine is addictive. It changes the brain and can make it hard to stop smoking. Tobacco also has other toxic chemicals that can hurt your body and raise your risk of many cancers.  How can smoking tobacco affect me?  Smoking tobacco puts you at risk for:  · Cancer. Smoking is most commonly associated with lung cancer, but can also lead to cancer in other parts of the body.  · Chronic obstructive pulmonary disease (COPD). This is a long-term lung condition that makes it hard to breathe. It also gets worse over time.  · High blood pressure (hypertension), heart disease, stroke, or heart attack.  · Lung infections, such as pneumonia.  · Cataracts. This is when the lenses in the eyes become clouded.  · Digestive problems. This may include peptic ulcers, heartburn, and gastroesophageal reflux disease (GERD).  · Oral health problems, such as gum disease and tooth loss.  · Loss of taste and smell.  Smoking can affect your appearance by causing:  · Wrinkles.  · Yellow or stained teeth, fingers, and fingernails.  Smoking tobacco can also affect your social life, because:  · It may be challenging to find places to smoke when away from home. Many workplaces, restaurants, hotels, and public places are tobacco-free.  · Smoking is expensive. This is due to the cost of tobacco and the long-term costs of treating health problems from smoking.  · Secondhand smoke may affect those around you. Secondhand smoke can cause lung cancer, breathing problems, and heart disease. Children of smokers have a higher risk for:  ? Sudden infant death syndrome (SIDS).  ? Ear infections.  ? Lung infections.  If you currently smoke tobacco, quitting now can help you:  · Lead a longer and healthier life.  · Look, smell, breathe, and feel better over time.  · Save money.  · Protect others from the harms of secondhand smoke.  What actions can I take to prevent health problems?  Quit  smoking    · Do not start smoking. Quit if you already do.  · Make a plan to quit smoking and commit to it. Look for programs to help you and ask your health care provider for recommendations and ideas.  · Set a date and write down all the reasons you want to quit.  · Let your friends and family know you are quitting so they can help and support you. Consider finding friends who also want to quit. It can be easier to quit with someone else, so that you can support each other.  · Talk with your health care provider about using nicotine replacement medicines to help you quit, such as gum, lozenges, patches, sprays, or pills.  · Do not replace cigarette smoking with electronic cigarettes, which are commonly called e-cigarettes. The safety of e-cigarettes is not known, and some may contain harmful chemicals.  · If you try to quit but return to smoking, stay positive. It is common to slip up when you first quit, so take it one day at a time.  · Be prepared for cravings. When you feel the urge to smoke, chew gum or suck on hard candy.  Lifestyle  · Stay busy and take care of your body.  · Drink enough fluid to keep your urine pale yellow.  · Get plenty of exercise and eat a healthy diet. This can help prevent weight gain after quitting.  · Monitor your eating habits. Quitting smoking can cause you to have a larger appetite than when you smoke.  · Find ways to relax. Go out with friends or family to a movie or a restaurant where people do not smoke.  · Ask your health care provider about having regular tests (screenings) to check for cancer. This may include blood tests, imaging tests, and other tests.  · Find ways to manage your stress, such as meditation, yoga, or exercise.  Where to find support  To get support to quit smoking, consider:  · Asking your health care provider for more information and resources.  · Taking classes to learn more about quitting smoking.  · Looking for local organizations that offer resources  about quitting smoking.  · Joining a support group for people who want to quit smoking in your local community.  · Calling the smokefree.gov counselor helpline: 1-800-Quit-Now (1-716.460.2428)  Where to find more information  You may find more information about quitting smoking from:  · HelpGuide.org: www.helpguide.org  · Smokefree.gov: smokefree.gov  · American Lung Association: www.lung.org  Contact a health care provider if you:  · Have problems breathing.  · Notice that your lips, nose, or fingers turn blue.  · Have chest pain.  · Are coughing up blood.  · Feel faint or you pass out.  · Have other health changes that cause you to worry.  Summary  · Smoking tobacco can negatively affect your health, the health of those around you, your finances, and your social life.  · Do not start smoking. Quit if you already do. If you need help quitting, ask your health care provider.  · Think about joining a support group for people who want to quit smoking in your local community. There are many effective programs that will help you to quit this behavior.  This information is not intended to replace advice given to you by your health care provider. Make sure you discuss any questions you have with your health care provider.  Document Released: 01/02/2018 Document Revised: 02/06/2019 Document Reviewed: 01/02/2018  [employer] Interactive Patient Education © 2019 [employer]

## 2020-03-05 ENCOUNTER — APPOINTMENT (OUTPATIENT)
Dept: MRI IMAGING | Facility: HOSPITAL | Age: 55
End: 2020-03-05

## 2020-03-05 ENCOUNTER — HOSPITAL ENCOUNTER (OUTPATIENT)
Dept: MRI IMAGING | Facility: HOSPITAL | Age: 55
Discharge: HOME | End: 2020-03-05
Admitting: NURSE PRACTITIONER

## 2020-03-05 DIAGNOSIS — M54.2 CERVICALGIA: ICD-10-CM

## 2020-03-05 PROCEDURE — 72141 MRI NECK SPINE W/O DYE: CPT

## 2020-03-05 NOTE — PROGRESS NOTES
No significant surgical intervention indicated.  Recommend conservative care.  Follow-up at next reasonably available appointment.  Small perineural cyst.

## 2020-03-13 ENCOUNTER — APPOINTMENT (OUTPATIENT)
Dept: MRI IMAGING | Facility: HOSPITAL | Age: 55
End: 2020-03-13

## 2020-04-29 ENCOUNTER — OFFICE VISIT (OUTPATIENT)
Dept: NEUROSURGERY | Facility: CLINIC | Age: 55
End: 2020-04-29

## 2020-04-29 VITALS — WEIGHT: 170 LBS | BODY MASS INDEX: 27.32 KG/M2 | HEIGHT: 66 IN

## 2020-04-29 DIAGNOSIS — M43.10 ANTEROLISTHESIS: ICD-10-CM

## 2020-04-29 DIAGNOSIS — E66.3 OVERWEIGHT (BMI 25.0-29.9): ICD-10-CM

## 2020-04-29 DIAGNOSIS — Z87.891 FORMER SMOKER: ICD-10-CM

## 2020-04-29 DIAGNOSIS — M51.36 DEGENERATION OF LUMBAR INTERVERTEBRAL DISC: ICD-10-CM

## 2020-04-29 DIAGNOSIS — M47.812 FACET ARTHROPATHY, CERVICAL: Primary | ICD-10-CM

## 2020-04-29 PROBLEM — M51.369 DEGENERATION OF LUMBAR INTERVERTEBRAL DISC: Status: ACTIVE | Noted: 2020-04-29

## 2020-04-29 PROCEDURE — 99213 OFFICE O/P EST LOW 20 MIN: CPT | Performed by: NEUROLOGICAL SURGERY

## 2020-04-29 NOTE — PROGRESS NOTES
Chief complaint:   Chief Complaint   Patient presents with   • Back Pain     F/U low back pain, this is improved from the recent PT that we ordered for her. No radicular symptoms. Prior history of lumbar surgery with chronic, residual R drop foot.   • Neck Pain     Found to have abnormal reflexes at appointment so sent for further evaluation, complains of worsening neck pain and wants this looked into more. No radicular symptoms. Complains of dropping objects frequently.       Subjective     HPI:   Interval History: Sherly returns today for follow-up and evaluation of lumbar pain and neck pain with hyperreflexia.    Regarding her low back pain.  She previously had a surgery performed approximately 17 years ago.  This is a 1 level lumbar, L4 laminectomy.  This was performed in [place].  The physician then returned the following year.  This was done for an acute herniation at L4/5.  And has resulted in a longstanding right foot drop.  She did improve after her initial surgery.  She describes 1 to 2 years of worsening low back pain.  This was debilitating at first but is improved significantly with physical therapy and Occupational Therapy.  She currently rates her back pain at a 4 out of 10.  This is chronic in nature.  She denies any radiculopathy, numbness, gait changes other than her chronic right foot slap.    Regarding her cervical pain.  MRI was obtained due to hyperreflexia discovered on her previous evaluation.  She denies any arm pain, numbness or tingling.  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  She does drop objects frequently.  She does however note that her mother always had high reflexes.    Review of Systems   Constitutional: Negative.    HENT: Negative.    Eyes: Negative.    Respiratory: Negative.    Cardiovascular: Negative.    Gastrointestinal: Negative.    Endocrine: Negative.    Genitourinary: Negative.    Musculoskeletal: Positive for back pain, neck pain  "and neck stiffness.   Skin: Negative.    Allergic/Immunologic: Negative.    Hematological: Negative.    Psychiatric/Behavioral: Negative.        PFSH:  Past Medical History:   Diagnosis Date   • Cervical cancer (CMS/HCC)    • Herpes genitalis    • Stroke (CMS/HCC)        Past Surgical History:   Procedure Laterality Date   • GASTRIC SLEEVE LAPAROSCOPIC     • HYSTERECTOMY     • LUMBAR DISCECTOMY  2004    L4-5 discectomy       Objective      Current Outpatient Medications   Medication Sig Dispense Refill   • acyclovir (ZOVIRAX) 400 MG tablet Take 400 mg by mouth Daily.     • albuterol sulfate  (90 Base) MCG/ACT inhaler Inhale 1 puff As Needed.     • buPROPion SR (WELLBUTRIN SR) 150 MG 12 hr tablet Take 150 mg by mouth 2 (Two) Times a Day.     • citalopram (CeleXA) 40 MG tablet Take 40 mg by mouth Daily.     • cyclobenzaprine (FLEXERIL) 10 MG tablet Take 10 mg by mouth As Needed.     • fexofenadine-pseudoephedrine (ALLEGRA-D)  MG per 12 hr tablet Take 1 tablet by mouth As Needed.     • ondansetron ODT (ZOFRAN-ODT) 4 MG disintegrating tablet Place 4 mg on the tongue As Needed.  1     No current facility-administered medications for this visit.        Vital Signs  Ht 167.6 cm (66\")   Wt 77.1 kg (170 lb)   BMI 27.44 kg/m²   Physical Exam   Constitutional: She is oriented to person, place, and time.   Eyes: Pupils are equal, round, and reactive to light. EOM are normal.   Neurological: She is oriented to person, place, and time. Gait normal.   Reflex Scores:       Tricep reflexes are 2+ on the right side and 2+ on the left side.       Bicep reflexes are 2+ on the right side and 2+ on the left side.       Brachioradialis reflexes are 2+ on the right side and 2+ on the left side.       Patellar reflexes are 3+ on the right side and 3+ on the left side.       Achilles reflexes are 2+ on the right side and 2+ on the left side.  Psychiatric: Her speech is normal.     Neurologic Exam     Mental Status   Oriented " to person, place, and time.   Speech: speech is normal     Cranial Nerves     CN II   Visual fields full to confrontation.     CN III, IV, VI   Pupils are equal, round, and reactive to light.  Extraocular motions are normal.     CN V   Right facial sensation deficit: none  Left facial sensation deficit: none    CN VII   Facial expression full, symmetric.     CN VIII   Hearing: intact    CN IX, X   Palate: symmetric    CN XI   Right sternocleidomastoid strength: normal  Left sternocleidomastoid strength: normal    CN XII   Tongue deviation: none    Motor Exam     Strength   Right deltoid: 5/5  Left deltoid: 5/5  Right biceps: 5/5  Left biceps: 5/5  Right triceps: 5/5  Left triceps: 5/5  Right interossei: 5/5  Left interossei: 5/5  Right iliopsoas: 5/5  Left iliopsoas: 5/5  Right quadriceps: 5/5  Left quadriceps: 5/5  Right anterior tibial: 5/5  Left anterior tibial: 5/5  Right gastroc: 5/5  Left gastroc: 5/5    Sensory Exam   Right arm light touch: normal  Left arm light touch: normal  Right leg light touch: normal  Left leg light touch: normal    Gait, Coordination, and Reflexes     Gait  Gait: normal (Mild right foot slap when walking.  No cane or walker needed.)    Reflexes   Right brachioradialis: 2+  Left brachioradialis: 2+  Right biceps: 2+  Left biceps: 2+  Right triceps: 2+  Left triceps: 2+  Right patellar: 3+  Left patellar: 3+  Right achilles: 2+  Left achilles: 2+  Right plantar: normal  Left plantar: normal  Right Perez: absent  Left Perez: absent        Assessment/Plan: Male  strength (pounds)  AGE Right Hand RH Norms Left Hand LH Norms   20-24   121+20.6   104+21.8   25-29   120+23.0   110+16.2   30-34   121+22.4   110+21.7   35-39   119+24   113+21.7   40-44   117+20.7   112+18.7   45-49   110+23.0   101+22.8   50-54 60*,62 113+18.1 50,50 102+17   55-59   101+26.7   83+23.4   60-64   90+20.4   77+20.3   65-69   91+20.6   76.8+19.8   70-74   75+21.5   65+18.1   75+   66+21.0   55+17.0   (Leny  DALE et al; Hand Dynometer: Effects of trials and sessions.  Perpetual and Motor Skills 61:195-8, 1985)     Results Review:   11/19/19 x-ray of the cervical spine shows no acute fractures, straightening of the normal cervical lordosis, anteriolisthesis of C3 over C4 that resolves with extension, retrolisthesis of C5 over C6 that appears to resolve with extension.         11/5/19 MRI of the lumbar spine shows no acute fractures, areas of bone marrow signal change within the vertebral bodies of the lumbar spine, no STIR signal change, the conus ends at normal level, no T2 signal change within the central cord, multiple areas of Schmorl nodes to the upper lumbar and lower thoracic spine, anteriolisthesis of L4 over L5 and L5 over S1, multilevel areas of facet arthropathy and ligamentous flavum hypertrophy resulting in central canal and bilateral foraminal narrowing at L3-4, and right foraminal stenosis at L4-5 and L5-S1.                  11/19/19      EXAMINATION:   MRI CERVICAL SPINE WO CONTRAST-  3/5/2020 4:24 PM CST     HISTORY: MRI CERVICAL SPINE WO CONTRAST- 3/5/2020 3:47 PM CST     HISTORY: neck pain; M54.2-Cervicalgia     COMPARISON: None      TECHNIQUE: Multiplanar, multisequence MRI of the cervical spine was  obtained without contrast.      FINDINGS:   Imaged portions of the cerebellum and brainstem are unremarkable.      Alignment: Normal cervical lordosis is maintained. There is no evidence  of listhesis or subluxation.      Marrow signal: No pathologic marrow infiltrate is demonstrated. The  vertebral body heights and posterior elements are maintained.      Cord/Canal: The spinal cord is normal in signal and morphology.      Soft tissues: The surrounding soft tissues are unremarkable.      Levels:   C2-C3: No disc bulge is present. No significant neuroforaminal or  central canal stenosis is seen.      C3-C4: No disc bulge is present. No significant neuroforaminal or  central canal stenosis is seen.      C4-C5:  No disc bulge is present. No significant neuroforaminal or  central canal stenosis is seen.      C5-C6: A broad base central and leftward disc hypertrophic osteophyte  complex is present. This compromises the left neural foramen. There is  some compromise of the right neural foramen. There appears to be a  perineural cyst associated with the left neural foramen.      C6-C7: No disc bulge is present. No significant neuroforaminal or  central canal stenosis is seen.      C7-T1: No disc bulge is present. No significant neuroforaminal or  central canal stenosis is seen.      IMPRESSION:  1. Broad-based central and leftward disc C5-C6 level. There some  compromise of the right neural foramen. There is also compromises the  left neural foramen. A perineural cyst is present just lateral to the  left neural foramen.            Assessment/Plan: Sherly Jones is a 55 y.o. female with a significant medical history of cancer of the cervix at age 21, prior L4-5 discectomy in 2014, stroke, and she is overweight.  She presents today for follow-up of both neck and lumbar back pain without complaints of significant upper or lower extremity radicular pain, numbness, or paresthesias.  BIGG: Neck 17; back 16. Physical exam findings of bilateral  strength weakness, and bilateral patellar hyperreflexia.  Her imaging: x-ray of the cervical spine shows no acute fractures mild anterior listhesis of C4/5, noncontrast MRI of the cervical spine shows a small disc bulge at C5/6, and a small perineural cyst on the left.  MRI of the lumbar spine shows an anteriolisthesis of L4 over L5 and L5 over S1, bilateral pars defect, and foraminal narrowing most prominent on the right at L5-S1.       Recommendations:  Cervicalgia  Hyperreflexia  Mak presents with predominantly axial back pain.  She has no evidence of loss of fine motor function or true radiculopathy.  My suspicion is that her hyperreflexia is either secondary to her previous  stroke or through familial hyperreflexia as her mother has the same abnormality.  Her cervical spine shows no surgical indications.  My suspicion is that her pain is most likely secondary to myofascial pain or facet arthropathy.  While she does have a small disc bulge this is not at the level of her anterior listhesis which is seen on flexion-extension films of her cervical spine.  Therefore I would recommend continued conservative care.  We discussed cardinal symptoms to watch for.  Patient was very relieved to find that she did not need surgery.     Lumbar back pain  Bilateral pars defects at L5  Anterior listhesis of L5 secondary to above  Right L5/1 foraminal stenosis  Sherly and I discussed her lumbar spine radiographs.  Indications for surgery include compression, true radiculopathy, or intractable back pain resulting in loss of function.  She does not show any evidence of central stenosis but only foraminal stenosis of the L5 nerve root.  She has no evidence of radiculopathy except for chronic right foot drop which is been present since her prior surgery.  And she is still able to participate in her work and pleasure activities.  Therefore I would recommend continued conservative management.  Should she develop severe back pain that is debilitating or true radiculopathy and I be happy to see her back to discuss L4/5 TLIF and L5/S1 anterior lumbar interbody fusion but as for now I recommend conservative care.     Lower urinary tract symptoms with history of cancer of the cervix  Lower urinary tract symptoms have resolved.  Labs: CBC, CMP, sed rate, CRP, and UA  were unremarkable.     Overweight: 25.0-29.9kg/m2   Body mass index is 27.44 kg/m².  Information on the DASH diet provided in the AVS.  We will continue to provided diet and exercise information with the goal of weight loss at each scheduled appointment.          1. Facet arthropathy, cervical    2. Anterolisthesis    3. Degeneration of lumbar  intervertebral disc    4. Overweight (BMI 25.0-29.9)    5. Former smoker        Recommendations:  Sherly was seen today for back pain and neck pain.    Diagnoses and all orders for this visit:    Facet arthropathy, cervical    Anterolisthesis    Degeneration of lumbar intervertebral disc    Overweight (BMI 25.0-29.9)    Former smoker        Return if symptoms worsen or fail to improve.    Level of Risk: Moderate due to: two stable chronic illnesses  MDM: Moderate Complexity  (Mod = 00608, High = 34418)    Thank you, for allowing me to continue to participate in the care of this patient.    Sincerely,  Thomas Alvarado MD

## 2020-05-07 NOTE — PROGRESS NOTES
Subjective    Ms. Jones is 55 y.o. female    Chief Complaint: Bladder Spasms.     History of Present Illness     Female Pelvic Pain  The patient complains of pain in or near the suprabupic area.  This is an intial visit about the pain. The onset is described as chronic with the character being sharp. The patient considers the quality or severity to be severe. Further considerations include does not radiate. Associated urinary symptoms include frequency and urgency. Other symptoms, not specifically related to urinary tract, include LUTS. The pain has been treated with anticholingergics  which did provide some relief.     The following portions of the patient's history were reviewed and updated as appropriate: allergies, current medications, past family history, past medical history, past social history, past surgical history and problem list.    Review of Systems   Constitutional: Negative for appetite change, diaphoresis and fever.   HENT: Negative for facial swelling and sore throat.    Eyes: Negative for discharge and visual disturbance.   Respiratory: Negative for cough and shortness of breath.    Cardiovascular: Negative for chest pain and leg swelling.   Gastrointestinal: Negative for anal bleeding and vomiting.   Endocrine: Negative for cold intolerance and heat intolerance.   Genitourinary: Positive for dysuria, frequency, pelvic pain and urgency.   Musculoskeletal: Negative for back pain and gait problem.   Skin: Negative for pallor and rash.   Allergic/Immunologic: Negative for immunocompromised state.   Neurological: Negative for seizures and headaches.   Hematological: Negative for adenopathy. Does not bruise/bleed easily.   Psychiatric/Behavioral: Negative for dysphoric mood, self-injury and suicidal ideas.         Current Outpatient Medications:   •  acyclovir (ZOVIRAX) 400 MG tablet, Take 400 mg by mouth Daily., Disp: , Rfl:   •  albuterol sulfate  (90 Base) MCG/ACT inhaler, Inhale 1 puff As  "Needed., Disp: , Rfl:   •  buPROPion SR (WELLBUTRIN SR) 150 MG 12 hr tablet, Take 150 mg by mouth 2 (Two) Times a Day., Disp: , Rfl:   •  citalopram (CeleXA) 40 MG tablet, Take 40 mg by mouth Daily., Disp: , Rfl:   •  cyclobenzaprine (FLEXERIL) 10 MG tablet, Take 10 mg by mouth As Needed., Disp: , Rfl:   •  fexofenadine-pseudoephedrine (ALLEGRA-D)  MG per 12 hr tablet, Take 1 tablet by mouth As Needed., Disp: , Rfl:   •  ondansetron ODT (ZOFRAN-ODT) 4 MG disintegrating tablet, Place 4 mg on the tongue As Needed., Disp: , Rfl: 1  •  Mirabegron ER (Myrbetriq) 50 MG tablet sustained-release 24 hour 24 hr tablet, Take 50 mg by mouth Daily., Disp: 90 tablet, Rfl: 3    Past Medical History:   Diagnosis Date   • Cervical cancer (CMS/HCC)    • Herpes genitalis    • Stroke (CMS/HCC)        Past Surgical History:   Procedure Laterality Date   • GASTRIC SLEEVE LAPAROSCOPIC     • HYSTERECTOMY     • LUMBAR DISCECTOMY  2004    L4-5 discectomy       Social History     Socioeconomic History   • Marital status:      Spouse name: Not on file   • Number of children: Not on file   • Years of education: Not on file   • Highest education level: Not on file   Tobacco Use   • Smoking status: Never Smoker   • Smokeless tobacco: Never Used   Substance and Sexual Activity   • Alcohol use: No     Frequency: Never   • Drug use: No   • Sexual activity: Defer       History reviewed. No pertinent family history.    Objective    Temp 97 °F (36.1 °C) (Temporal)   Ht 168.9 cm (66.5\")   Wt 79.4 kg (175 lb)   BMI 27.82 kg/m²     Physical Exam   Constitutional: She is oriented to person, place, and time. She appears well-developed and well-nourished. No distress.   HENT:   Head: Normocephalic and atraumatic.   Right Ear: External ear and ear canal normal.   Left Ear: External ear and ear canal normal.   Nose: No nasal deformity. No epistaxis.   Mouth/Throat: Oropharynx is clear and moist. Mucous membranes are not pale, not dry and not " cyanotic. Normal dentition. No oropharyngeal exudate.   Neck: Trachea normal. No tracheal tenderness present. No tracheal deviation present. No thyroid mass and no thyromegaly present.   Pulmonary/Chest: Effort normal. No accessory muscle usage. No respiratory distress. Chest wall is not dull to percussion (No flatness or hyperresonance). She exhibits no mass and no tenderness.   On palpation, no tactile fremitus. All movements are symmetric. No intercostal retraction noted.    Abdominal: Soft. Normal appearance. She exhibits no distension and no mass. There is no hepatosplenomegaly. There is tenderness. No hernia.   Rectal examination or stool specimen is not indicated.    Musculoskeletal:   Normal gait and station. The spine, ribs, and pelvis are examined. No obvious misalignment or asymmetry. ROM is reasonable for age. No instability. No obvious atrophy, flaccidity or spasticity.    Lymphadenopathy:     She has no cervical adenopathy.        Right: No inguinal adenopathy present.        Left: No inguinal adenopathy present.   Neurological: She is alert and oriented to person, place, and time.   Skin: Skin is warm, dry and intact. No lesion and no rash noted. She is not diaphoretic. No cyanosis. No pallor. Nails show no clubbing.   On palpation, there were no induration, subcutaneous nodules, or tightening   Psychiatric: Her speech is normal and behavior is normal. Judgment and thought content normal. Her mood appears not anxious. Her affect is not labile. She does not exhibit a depressed mood.   Vitals reviewed.          Results for orders placed or performed in visit on 05/11/20   POC Urinalysis Dipstick, Multipro   Result Value Ref Range    Color Yellow Yellow, Straw, Dark Yellow, Luana    Clarity, UA Clear Clear    Glucose, UA Negative Negative, 1000 mg/dL (3+) mg/dL    Bilirubin Negative Negative    Ketones, UA Negative Negative    Specific Gravity  1.015 1.005 - 1.030    Blood, UA Negative Negative    pH,  Urine 7.0 5.0 - 8.0    Protein, POC Negative Negative mg/dL    Urobilinogen, UA Normal Normal    Nitrite, UA Negative Negative    Leukocytes Trace (A) Negative     Assessment and Plan    Diagnoses and all orders for this visit:    Cystitis, interstitial  -     POC Urinalysis Dipstick, Multipro    Frequency of urination  -     Mirabegron ER (Myrbetriq) 50 MG tablet sustained-release 24 hour 24 hr tablet; Take 50 mg by mouth Daily.    Urgency of urination  -     Mirabegron ER (Myrbetriq) 50 MG tablet sustained-release 24 hour 24 hr tablet; Take 50 mg by mouth Daily.        Patient here with suprapubic pain as well as frequency urgency greater in duration in 6 months.  She does have symptoms that seem consistent with the interstitial cystitis spectrum.  We will proceed with cystoscopic examination to evaluate her bladder.  Regarding her voiding symptoms oxybutynin has been ineffective and I am going to try her on Myrbetriq.  We also discussed dietary therapy with removing bladder irritants from the bladder which I have recommended.        Answers for HPI/ROS submitted by the patient on 5/8/2020   Dysuria  What is the primary reason for your visit?: Painful Urination

## 2020-05-11 ENCOUNTER — OFFICE VISIT (OUTPATIENT)
Dept: UROLOGY | Facility: CLINIC | Age: 55
End: 2020-05-11

## 2020-05-11 VITALS — BODY MASS INDEX: 27.47 KG/M2 | HEIGHT: 67 IN | TEMPERATURE: 97 F | WEIGHT: 175 LBS

## 2020-05-11 DIAGNOSIS — N30.10 CYSTITIS, INTERSTITIAL: Primary | ICD-10-CM

## 2020-05-11 DIAGNOSIS — R39.15 URGENCY OF URINATION: ICD-10-CM

## 2020-05-11 DIAGNOSIS — R35.0 FREQUENCY OF URINATION: ICD-10-CM

## 2020-05-11 LAB
BILIRUB BLD-MCNC: NEGATIVE MG/DL
CLARITY, POC: CLEAR
COLOR UR: YELLOW
GLUCOSE UR STRIP-MCNC: NEGATIVE MG/DL
KETONES UR QL: NEGATIVE
LEUKOCYTE EST, POC: ABNORMAL
NITRITE UR-MCNC: NEGATIVE MG/ML
PH UR: 7 [PH] (ref 5–8)
PROT UR STRIP-MCNC: NEGATIVE MG/DL
RBC # UR STRIP: NEGATIVE /UL
SP GR UR: 1.01 (ref 1–1.03)
UROBILINOGEN UR QL: NORMAL

## 2020-05-11 PROCEDURE — 51798 US URINE CAPACITY MEASURE: CPT | Performed by: UROLOGY

## 2020-05-11 PROCEDURE — 99204 OFFICE O/P NEW MOD 45 MIN: CPT | Performed by: UROLOGY

## 2020-05-11 PROCEDURE — 81001 URINALYSIS AUTO W/SCOPE: CPT | Performed by: UROLOGY

## 2020-05-11 RX ORDER — OXYBUTYNIN CHLORIDE 5 MG/1
10 TABLET ORAL 3 TIMES DAILY
COMMUNITY
End: 2020-05-11

## 2020-05-15 ENCOUNTER — TELEPHONE (OUTPATIENT)
Dept: UROLOGY | Facility: CLINIC | Age: 55
End: 2020-05-15

## 2020-05-27 ENCOUNTER — PROCEDURE VISIT (OUTPATIENT)
Dept: UROLOGY | Facility: CLINIC | Age: 55
End: 2020-05-27

## 2020-05-27 DIAGNOSIS — N30.10 CYSTITIS, INTERSTITIAL: Primary | ICD-10-CM

## 2020-05-27 DIAGNOSIS — N32.9 LESION OF BLADDER: ICD-10-CM

## 2020-05-27 LAB
BILIRUB BLD-MCNC: NEGATIVE MG/DL
CLARITY, POC: CLEAR
COLOR UR: YELLOW
GLUCOSE UR STRIP-MCNC: NEGATIVE MG/DL
KETONES UR QL: NEGATIVE
LEUKOCYTE EST, POC: NEGATIVE
NITRITE UR-MCNC: NEGATIVE MG/ML
PH UR: 5.5 [PH] (ref 5–8)
PROT UR STRIP-MCNC: ABNORMAL MG/DL
RBC # UR STRIP: NEGATIVE /UL
SP GR UR: 1.02 (ref 1–1.03)
UROBILINOGEN UR QL: NORMAL

## 2020-05-27 PROCEDURE — 52000 CYSTOURETHROSCOPY: CPT | Performed by: UROLOGY

## 2020-05-27 PROCEDURE — 99213 OFFICE O/P EST LOW 20 MIN: CPT | Performed by: UROLOGY

## 2020-05-27 PROCEDURE — 81001 URINALYSIS AUTO W/SCOPE: CPT | Performed by: UROLOGY

## 2020-05-27 RX ORDER — SODIUM CHLORIDE 9 MG/ML
100 INJECTION, SOLUTION INTRAVENOUS CONTINUOUS
Status: CANCELLED | OUTPATIENT
Start: 2020-05-27

## 2020-05-27 NOTE — PROGRESS NOTES
Subjective    Ms. Jones is 55 y.o. female    Chief Complaint: Pelvic Pain    History of Present Illness       Female Pelvic Pain  The patient complains of pain in or near the suprabupic area.  This is an intial visit about the pain. The onset is described as chronic with the character being sharp. The patient considers the quality or severity to be severe. Further considerations include does not radiate. Associated urinary symptoms include frequency and urgency. Other symptoms, not specifically related to urinary tract, include LUTS. The pain has been treated with anticholingergics  which did provide some relief.   The following portions of the patient's history were reviewed and updated as appropriate: allergies, current medications, past family history, past medical history, past social history, past surgical history and problem list.    Review of Systems   Constitutional: Negative for chills and fever.   Gastrointestinal: Negative for abdominal pain, anal bleeding and blood in stool.   Genitourinary: Positive for pelvic pain. Negative for dysuria and hematuria.         Current Outpatient Medications:   •  acyclovir (ZOVIRAX) 400 MG tablet, Take 400 mg by mouth Daily., Disp: , Rfl:   •  albuterol sulfate  (90 Base) MCG/ACT inhaler, Inhale 1 puff As Needed., Disp: , Rfl:   •  buPROPion SR (WELLBUTRIN SR) 150 MG 12 hr tablet, Take 150 mg by mouth 2 (Two) Times a Day., Disp: , Rfl:   •  citalopram (CeleXA) 40 MG tablet, Take 40 mg by mouth Daily., Disp: , Rfl:   •  cyclobenzaprine (FLEXERIL) 10 MG tablet, Take 10 mg by mouth As Needed., Disp: , Rfl:   •  fexofenadine-pseudoephedrine (ALLEGRA-D)  MG per 12 hr tablet, Take 1 tablet by mouth As Needed., Disp: , Rfl:   •  Mirabegron ER (Myrbetriq) 50 MG tablet sustained-release 24 hour 24 hr tablet, Take 50 mg by mouth Daily., Disp: 90 tablet, Rfl: 3  •  ondansetron ODT (ZOFRAN-ODT) 4 MG disintegrating tablet, Place 4 mg on the tongue As Needed., Disp: ,  Rfl: 1    Past Medical History:   Diagnosis Date   • Cervical cancer (CMS/HCC)    • Herpes genitalis    • Stroke (CMS/HCC)        Past Surgical History:   Procedure Laterality Date   • GASTRIC SLEEVE LAPAROSCOPIC     • HYSTERECTOMY     • LUMBAR DISCECTOMY  2004    L4-5 discectomy       Social History     Socioeconomic History   • Marital status:      Spouse name: Not on file   • Number of children: Not on file   • Years of education: Not on file   • Highest education level: Not on file   Tobacco Use   • Smoking status: Never Smoker   • Smokeless tobacco: Never Used   Substance and Sexual Activity   • Alcohol use: No     Frequency: Never   • Drug use: No   • Sexual activity: Defer       No family history on file.    Objective    There were no vitals taken for this visit.    Physical Exam   Constitutional: She is oriented to person, place, and time. She appears well-developed and well-nourished. No distress.   Pulmonary/Chest: Effort normal.   Abdominal: Soft. She exhibits no distension and no mass. There is no tenderness. There is no rebound and no guarding. No hernia.   Neurological: She is alert and oriented to person, place, and time.   Skin: Skin is warm and dry. She is not diaphoretic.   Psychiatric: She has a normal mood and affect.   Vitals reviewed.    Pre- operative diagnosis:  Lower urinary tract symptoms    Post operative diagnosis:  Bladder Lesion    Procedure:  The patient was prepped and draped in a normal sterile fashion.  The urethra was anesthetized with 2% lidocaine jelly.  A flexible cystoscope was introduced per urethra.      Urethra:  Normal    Bladder:  Flat erythematous area posterior bladder wall left lateral bladder wall    Ureteral orifices:  Normal position bilaterally and Clear efflux bilaterally    Patient tolerated the procedure well    Complications: none    Blood loss: minimal    Follow up:    Bladder biopsy        Results for orders placed or performed in visit on 05/27/20    POC Urinalysis Dipstick, Multipro   Result Value Ref Range    Color Yellow Yellow, Straw, Dark Yellow, Luana    Clarity, UA Clear Clear    Glucose, UA Negative Negative, 1000 mg/dL (3+) mg/dL    Bilirubin Negative Negative    Ketones, UA Negative Negative    Specific Gravity  1.020 1.005 - 1.030    Blood, UA Negative Negative    pH, Urine 5.5 5.0 - 8.0    Protein, POC Trace (A) Negative mg/dL    Urobilinogen, UA Normal Normal    Nitrite, UA Negative Negative    Leukocytes Negative Negative     Assessment and Plan    Diagnoses and all orders for this visit:    Cystitis, interstitial  -     POC Urinalysis Dipstick, Multipro    Lesion of bladder  -     Case Request; Standing    Other orders  -     Follow Anesthesia Guidelines / Standing Orders; Future  -     Obtain informed consent  -     Provide NPO Instructions to Patient; Future  -     Chlorhexidine Skin Prep; Future        Cystoscopy today showed some erythematous area in the posterior and left lateral wall that is flat concerning for possible CIS.  We will plan for with my plan for cystoscopy with bladder biopsy in the near future.    This resulted in significant evaluation management in addition to the cystoscopy performed today.

## 2020-06-08 ENCOUNTER — TRANSCRIBE ORDERS (OUTPATIENT)
Dept: ADMINISTRATIVE | Facility: HOSPITAL | Age: 55
End: 2020-06-08

## 2020-06-08 DIAGNOSIS — Z01.818 PREOP TESTING: Primary | ICD-10-CM

## 2020-06-09 ENCOUNTER — APPOINTMENT (OUTPATIENT)
Dept: PREADMISSION TESTING | Facility: HOSPITAL | Age: 55
End: 2020-06-09

## 2020-06-09 VITALS
DIASTOLIC BLOOD PRESSURE: 65 MMHG | HEART RATE: 73 BPM | OXYGEN SATURATION: 95 % | BODY MASS INDEX: 29.79 KG/M2 | RESPIRATION RATE: 18 BRPM | SYSTOLIC BLOOD PRESSURE: 140 MMHG | WEIGHT: 178.79 LBS | HEIGHT: 65 IN

## 2020-06-09 LAB
ANION GAP SERPL CALCULATED.3IONS-SCNC: 12 MMOL/L (ref 5–15)
BUN BLD-MCNC: 15 MG/DL (ref 6–20)
BUN/CREAT SERPL: 22.7 (ref 7–25)
CALCIUM SPEC-SCNC: 9.8 MG/DL (ref 8.6–10.5)
CHLORIDE SERPL-SCNC: 102 MMOL/L (ref 98–107)
CO2 SERPL-SCNC: 28 MMOL/L (ref 22–29)
CREAT BLD-MCNC: 0.66 MG/DL (ref 0.57–1)
DEPRECATED RDW RBC AUTO: 44.7 FL (ref 37–54)
ERYTHROCYTE [DISTWIDTH] IN BLOOD BY AUTOMATED COUNT: 13.8 % (ref 12.3–15.4)
GFR SERPL CREATININE-BSD FRML MDRD: 93 ML/MIN/1.73
GLUCOSE BLD-MCNC: 111 MG/DL (ref 65–99)
HCT VFR BLD AUTO: 40.2 % (ref 34–46.6)
HGB BLD-MCNC: 12.8 G/DL (ref 12–15.9)
MCH RBC QN AUTO: 28.1 PG (ref 26.6–33)
MCHC RBC AUTO-ENTMCNC: 31.8 G/DL (ref 31.5–35.7)
MCV RBC AUTO: 88.2 FL (ref 79–97)
PLATELET # BLD AUTO: 261 10*3/MM3 (ref 140–450)
PMV BLD AUTO: 10.3 FL (ref 6–12)
POTASSIUM BLD-SCNC: 4 MMOL/L (ref 3.5–5.2)
RBC # BLD AUTO: 4.56 10*6/MM3 (ref 3.77–5.28)
SODIUM BLD-SCNC: 142 MMOL/L (ref 136–145)
WBC NRBC COR # BLD: 7.65 10*3/MM3 (ref 3.4–10.8)

## 2020-06-09 PROCEDURE — 93005 ELECTROCARDIOGRAM TRACING: CPT

## 2020-06-09 PROCEDURE — 80048 BASIC METABOLIC PNL TOTAL CA: CPT | Performed by: UROLOGY

## 2020-06-09 PROCEDURE — 93010 ELECTROCARDIOGRAM REPORT: CPT | Performed by: INTERNAL MEDICINE

## 2020-06-09 PROCEDURE — 85027 COMPLETE CBC AUTOMATED: CPT | Performed by: UROLOGY

## 2020-06-09 PROCEDURE — 36415 COLL VENOUS BLD VENIPUNCTURE: CPT

## 2020-06-09 NOTE — DISCHARGE INSTRUCTIONS
DAY OF SURGERY INSTRUCTIONS        YOUR SURGEON: dr caputo    PROCEDURE: ***cystoscopy bladder biopsy with fulguration    DATE OF SURGERY: ***6/18/2020    ARRIVAL TIME: AS DIRECTED BY OFFICE    YOU MAY TAKE THE FOLLOWING MEDICATION(S) THE MORNING OF SURGERY WITH A SIP OF WATER: ***no medicines per anesthesia    ALL OTHER HOME MEDICATIONS CHECK WITH YOUR DOCTOR                  MANAGING PAIN AFTER SURGERY    We know you are probably wondering what your pain will be like after surgery.  Following surgery it is unrealistic to expect you will not have pain.   Pain is how our bodies let us know that something is wrong or cautions us to be careful.  That said, our goal is to make your pain tolerable.    Methods we may use to treat your pain include (oral or IV medications, PCAs, epidurals, nerve blocks, etc.)   While some procedures require IV pain medications for a short time after surgery, transitioning to pain medications by mouth allows for better management of pain.   Your nurse will encourage you to take oral pain medications whenever possible.  IV medications work almost immediately, but only last a short while.  Taking medications by mouth allows for a more constant level of medication in your blood stream for a longer period of time.      Once your pain is out of control it is harder to get back under control.  It is important you are aware when your next dose of pain medication is due.  If you are admitted, your nurse may write the time of your next dose on the white board in your room to help you remember.      We are interested in your pain and encourage you to inform us about aggravating factors during your visit.   Many times a simple repositioning every few hours can make a big difference.    If your physician says it is okay, do not let your pain prevent you from getting out of bed. Be sure to call your nurse for assistance prior to getting up so you do not fall.      Before surgery, please decide your  tolerable pain goal.  These faces help describe the pain ratings we use on a 0-10 scale.   Be prepared to tell us your goal and whether or not you take pain or anxiety medications at home.      BEFORE YOU COME TO THE HOSPITAL  (Pre-op instructions)  • Do not eat, drink, smoke or chew gum after midnight the night before surgery.  This also includes no mints.  • Morning of surgery take only the medicines you have been instructed with a sip of water unless otherwise instructed  by your physician.  • Do not shave, wear makeup or dark nail polish.  • Remove all jewelry including rings.  • Leave anything you consider valuable at home.  • Leave your suitcase in the car until after your surgery.  • Bring the following with you if applicable:  o Picture ID and insurance, Medicare or Medicaid cards  o Co-pay/deductible required by insurance (cash, check, credit card)  o Copy of advance directive, living will or power-of- documents if not brought to PAT  o CPAP or BIPAP mask and tubing  o Relaxation aids ( book, magazine), etc.  o Hearing aids                                 ON THE DAY OF SURGERY  · On the day of surgery check in at registration located at the main entrance of the hospital.   ? You will be registered and given a beeper with instructions where to wait in the main lobby.  ? When your beeper lights up and vibrates a member of the Outpatient Surgery staff will meet you at the double doors under the stair steps and escort you to your preoperative room.   · You may have cloth compression devices placed on your legs. These help to prevent blood clots and reduce swelling in your legs.  · An IV may be inserted into one of your veins.  · In the operating room, you may be given one or more of the following:  ? A medicine to help you relax (sedative).  ? A medicine to numb the area (local anesthetic).  ? A medicine to make you fall asleep (general anesthetic).  ? A medicine that is injected into an area of your  "body to numb everything below the injection site (regional anesthetic).  · Your surgical site will be marked or identified.  · You may be given an antibiotic through your IV to help prevent infection.  Contact a health care provider if you:  · Develop a fever of more than 100.4°F (38°C) or other feelings of illness during the 48 hours before your surgery.  · Have symptoms that get worse.  Have questions or concerns about your surgery    General Anesthesia/Surgery, Adult  General anesthesia is the use of medicines to make a person \"go to sleep\" (unconscious) for a medical procedure. General anesthesia must be used for certain procedures, and is often recommended for procedures that:  · Last a long time.  · Require you to be still or in an unusual position.  · Are major and can cause blood loss.  The medicines used for general anesthesia are called general anesthetics. As well as making you unconscious for a certain amount of time, these medicines:  · Prevent pain.  · Control your blood pressure.  · Relax your muscles.  Tell a health care provider about:  · Any allergies you have.  · All medicines you are taking, including vitamins, herbs, eye drops, creams, and over-the-counter medicines.  · Any problems you or family members have had with anesthetic medicines.  · Types of anesthetics you have had in the past.  · Any blood disorders you have.  · Any surgeries you have had.  · Any medical conditions you have.  · Any recent upper respiratory, chest, or ear infections.  · Any history of:  ? Heart or lung conditions, such as heart failure, sleep apnea, asthma, or chronic obstructive pulmonary disease (COPD).  ?  service.  ? Depression or anxiety.  · Any tobacco or drug use, including marijuana or alcohol use.  · Whether you are pregnant or may be pregnant.  What are the risks?  Generally, this is a safe procedure. However, problems may occur, including:  · Allergic reaction.  · Lung and heart " problems.  · Inhaling food or liquid from the stomach into the lungs (aspiration).  · Nerve injury.  · Air in the bloodstream, which can lead to stroke.  · Extreme agitation or confusion (delirium) when you wake up from the anesthetic.  · Waking up during your procedure and being unable to move. This is rare.  These problems are more likely to develop if you are having a major surgery or if you have an advanced or serious medical condition. You can prevent some of these complications by answering all of your health care provider's questions thoroughly and by following all instructions before your procedure.  General anesthesia can cause side effects, including:  · Nausea or vomiting.  · A sore throat from the breathing tube.  · Hoarseness.  · Wheezing or coughing.  · Shaking chills.  · Tiredness.  · Body aches.  · Anxiety.  · Sleepiness or drowsiness.  · Confusion or agitation.  RISKS AND COMPLICATIONS OF SURGERY  Your health care provider will discuss possible risks and complications with you before surgery. Common risks and complications include:    · Problems due to the use of anesthetics.  · Blood loss and replacement (does not apply to minor surgical procedures).  · Temporary increase in pain due to surgery.  · Uncorrected pain or problems that the surgery was meant to correct.  · Infection.  · New damage.    What happens before the procedure?    Medicines  Ask your health care provider about:  · Changing or stopping your regular medicines. This is especially important if you are taking diabetes medicines or blood thinners.  · Taking medicines such as aspirin and ibuprofen. These medicines can thin your blood. Do not take these medicines unless your health care provider tells you to take them.  · Taking over-the-counter medicines, vitamins, herbs, and supplements. Do not take these during the week before your procedure unless your health care provider approves them.  General instructions  · Starting 3-6 weeks  before the procedure, do not use any products that contain nicotine or tobacco, such as cigarettes and e-cigarettes. If you need help quitting, ask your health care provider.  · If you brush your teeth on the morning of the procedure, make sure to spit out all of the toothpaste.  · Tell your health care provider if you become ill or develop a cold, cough, or fever.  · If instructed by your health care provider, bring your sleep apnea device with you on the day of your surgery (if applicable).  · Ask your health care provider if you will be going home the same day, the following day, or after a longer hospital stay.  ? Plan to have someone take you home from the hospital or clinic.  ? Plan to have a responsible adult care for you for at least 24 hours after you leave the hospital or clinic. This is important.  What happens during the procedure?  · You will be given anesthetics through both of the following:  ? A mask placed over your nose and mouth.  ? An IV in one of your veins.  · You may receive a medicine to help you relax (sedative).  · After you are unconscious, a breathing tube may be inserted down your throat to help you breathe. This will be removed before you wake up.  · An anesthesia specialist will stay with you throughout your procedure. He or she will:  ? Keep you comfortable and safe by continuing to give you medicines and adjusting the amount of medicine that you get.  ? Monitor your blood pressure, pulse, and oxygen levels to make sure that the anesthetics do not cause any problems.  The procedure may vary among health care providers and hospitals.  What happens after the procedure?  · Your blood pressure, temperature, heart rate, breathing rate, and blood oxygen level will be monitored until the medicines you were given have worn off.  · You will wake up in a recovery area. You may wake up slowly.  · If you feel anxious or agitated, you may be given medicine to help you calm down.  · If you will be  going home the same day, your health care provider may check to make sure you can walk, drink, and urinate.  · Your health care provider will treat any pain or side effects you have before you go home.  · Do not drive for 24 hours if you were given a sedative.  Summary  · General anesthesia is used to keep you still and prevent pain during a procedure.  · It is important to tell your healthcare provider about your medical history and any surgeries you have had, and previous experience with anesthesia.  · Follow your healthcare provider’s instructions about when to stop eating, drinking, or taking certain medicines before your procedure.  · Plan to have someone take you home from the hospital or clinic.  This information is not intended to replace advice given to you by your health care provider. Make sure you discuss any questions you have with your health care provider.  Document Released: 03/26/2009 Document Revised: 08/03/2018 Document Reviewed: 08/03/2018  [employer] Interactive Patient Education © 2019 [employer]      Fall Prevention in Hospitals, Adult  As a hospital patient, your condition and the treatments you receive can increase your risk for falls. Some additional risk factors for falls in a hospital include:  · Being in an unfamiliar environment.  · Being on bed rest.  · Your surgery.  · Taking certain medicines.  · Your tubing requirements, such as intravenous (IV) therapy or catheters.  It is important that you learn how to decrease fall risks while at the hospital. Below are important tips that can help prevent falls.  SAFETY TIPS FOR PREVENTING FALLS  Talk about your risk of falling.  · Ask your health care provider why you are at risk for falling. Is it your medicine, illness, tubing placement, or something else?  · Make a plan with your health care provider to keep you safe from falls.  · Ask your health care provider or pharmacist about side effects of your medicines. Some medicines can make you  dizzy or affect your coordination.  Ask for help.  · Ask for help before getting out of bed. You may need to press your call button.  · Ask for assistance in getting safely to the toilet.  · Ask for a walker or cane to be put at your bedside. Ask that most of the side rails on your bed be placed up before your health care provider leaves the room.  · Ask family or friends to sit with you.  · Ask for things that are out of your reach, such as your glasses, hearing aids, telephone, bedside table, or call button.  Follow these tips to avoid falling:  · Stay lying or seated, rather than standing, while waiting for help.  · Wear rubber-soled slippers or shoes whenever you walk in the hospital.  · Avoid quick, sudden movements.  ¨ Change positions slowly.  ¨ Sit on the side of your bed before standing.  ¨ Stand up slowly and wait before you start to walk.  · Let your health care provider know if there is a spill on the floor.  · Pay careful attention to the medical equipment, electrical cords, and tubes around you.  · When you need help, use your call button by your bed or in the bathroom. Wait for one of your health care providers to help you.  · If you feel dizzy or unsure of your footing, return to bed and wait for assistance.  · Avoid being distracted by the TV, telephone, or another person in your room.  · Do not lean or support yourself on rolling objects, such as IV poles or bedside tables.     This information is not intended to replace advice given to you by your health care provider. Make sure you discuss any questions you have with your health care provider.     Document Released: 12/15/2001 Document Revised: 01/08/2016 Document Reviewed: 08/25/2013  [employer] Interactive Patient Education ©2016 [employer]            PATIENT/FAMILY/RESPONSIBLE PARTY VERBALIZES UNDERSTANDING OF ABOVE EDUCATION.  COPY OF PAIN SCALE GIVEN AND REVIEWED WITH VERBALIZED UNDERSTANDING.

## 2020-06-15 ENCOUNTER — LAB (OUTPATIENT)
Dept: LAB | Facility: HOSPITAL | Age: 55
End: 2020-06-15

## 2020-06-15 ENCOUNTER — APPOINTMENT (OUTPATIENT)
Dept: LAB | Facility: HOSPITAL | Age: 55
End: 2020-06-15

## 2020-06-15 PROCEDURE — U0003 INFECTIOUS AGENT DETECTION BY NUCLEIC ACID (DNA OR RNA); SEVERE ACUTE RESPIRATORY SYNDROME CORONAVIRUS 2 (SARS-COV-2) (CORONAVIRUS DISEASE [COVID-19]), AMPLIFIED PROBE TECHNIQUE, MAKING USE OF HIGH THROUGHPUT TECHNOLOGIES AS DESCRIBED BY CMS-2020-01-R: HCPCS | Performed by: UROLOGY

## 2020-06-16 LAB
COVID LABCORP PRIORITY: NORMAL
SARS-COV-2 RNA RESP QL NAA+PROBE: NOT DETECTED

## 2020-06-18 ENCOUNTER — ANESTHESIA (OUTPATIENT)
Dept: PERIOP | Facility: HOSPITAL | Age: 55
End: 2020-06-18

## 2020-06-18 ENCOUNTER — HOSPITAL ENCOUNTER (OUTPATIENT)
Facility: HOSPITAL | Age: 55
Setting detail: HOSPITAL OUTPATIENT SURGERY
Discharge: HOME | End: 2020-06-18
Attending: UROLOGY | Admitting: UROLOGY

## 2020-06-18 ENCOUNTER — ANESTHESIA EVENT (OUTPATIENT)
Dept: PERIOP | Facility: HOSPITAL | Age: 55
End: 2020-06-18

## 2020-06-18 VITALS
DIASTOLIC BLOOD PRESSURE: 67 MMHG | HEART RATE: 74 BPM | RESPIRATION RATE: 16 BRPM | TEMPERATURE: 97.6 F | OXYGEN SATURATION: 98 % | SYSTOLIC BLOOD PRESSURE: 118 MMHG

## 2020-06-18 DIAGNOSIS — N32.9 LESION OF BLADDER: ICD-10-CM

## 2020-06-18 PROCEDURE — 52224 CYSTOSCOPY AND TREATMENT: CPT | Performed by: UROLOGY

## 2020-06-18 PROCEDURE — 25010000002 SUCCINYLCHOLINE PER 20 MG: Performed by: NURSE ANESTHETIST, CERTIFIED REGISTERED

## 2020-06-18 PROCEDURE — 88305 TISSUE EXAM BY PATHOLOGIST: CPT | Performed by: UROLOGY

## 2020-06-18 PROCEDURE — 25010000002 DEXAMETHASONE PER 1 MG: Performed by: NURSE ANESTHETIST, CERTIFIED REGISTERED

## 2020-06-18 PROCEDURE — 25010000002 FENTANYL CITRATE (PF) 100 MCG/2ML SOLUTION: Performed by: NURSE ANESTHETIST, CERTIFIED REGISTERED

## 2020-06-18 PROCEDURE — 25010000002 ONDANSETRON PER 1 MG: Performed by: NURSE ANESTHETIST, CERTIFIED REGISTERED

## 2020-06-18 PROCEDURE — 25010000002 MIDAZOLAM PER 1 MG: Performed by: ANESTHESIOLOGY

## 2020-06-18 PROCEDURE — 25010000002 PROPOFOL 10 MG/ML EMULSION: Performed by: NURSE ANESTHETIST, CERTIFIED REGISTERED

## 2020-06-18 RX ORDER — SUCCINYLCHOLINE CHLORIDE 20 MG/ML
INJECTION INTRAMUSCULAR; INTRAVENOUS AS NEEDED
Status: DISCONTINUED | OUTPATIENT
Start: 2020-06-18 | End: 2020-06-18 | Stop reason: SURG

## 2020-06-18 RX ORDER — SORBITOL 30 G/1000ML
IRRIGANT IRRIGATION AS NEEDED
Status: DISCONTINUED | OUTPATIENT
Start: 2020-06-18 | End: 2020-06-18 | Stop reason: HOSPADM

## 2020-06-18 RX ORDER — SODIUM CHLORIDE 0.9 % (FLUSH) 0.9 %
3 SYRINGE (ML) INJECTION AS NEEDED
Status: DISCONTINUED | OUTPATIENT
Start: 2020-06-18 | End: 2020-06-18 | Stop reason: HOSPADM

## 2020-06-18 RX ORDER — PHENAZOPYRIDINE HYDROCHLORIDE 100 MG/1
100 TABLET, FILM COATED ORAL 3 TIMES DAILY PRN
Qty: 21 TABLET | Refills: 1 | Status: SHIPPED | OUTPATIENT
Start: 2020-06-18 | End: 2022-08-19

## 2020-06-18 RX ORDER — FENTANYL CITRATE 50 UG/ML
INJECTION, SOLUTION INTRAMUSCULAR; INTRAVENOUS AS NEEDED
Status: DISCONTINUED | OUTPATIENT
Start: 2020-06-18 | End: 2020-06-18 | Stop reason: SURG

## 2020-06-18 RX ORDER — IBUPROFEN 600 MG/1
600 TABLET ORAL ONCE AS NEEDED
Status: DISCONTINUED | OUTPATIENT
Start: 2020-06-18 | End: 2020-06-18 | Stop reason: HOSPADM

## 2020-06-18 RX ORDER — SODIUM CHLORIDE, SODIUM LACTATE, POTASSIUM CHLORIDE, CALCIUM CHLORIDE 600; 310; 30; 20 MG/100ML; MG/100ML; MG/100ML; MG/100ML
100 INJECTION, SOLUTION INTRAVENOUS CONTINUOUS
Status: DISCONTINUED | OUTPATIENT
Start: 2020-06-18 | End: 2020-06-18 | Stop reason: HOSPADM

## 2020-06-18 RX ORDER — DEXAMETHASONE SODIUM PHOSPHATE 4 MG/ML
INJECTION, SOLUTION INTRA-ARTICULAR; INTRALESIONAL; INTRAMUSCULAR; INTRAVENOUS; SOFT TISSUE AS NEEDED
Status: DISCONTINUED | OUTPATIENT
Start: 2020-06-18 | End: 2020-06-18 | Stop reason: SURG

## 2020-06-18 RX ORDER — FLUMAZENIL 0.1 MG/ML
0.2 INJECTION INTRAVENOUS AS NEEDED
Status: DISCONTINUED | OUTPATIENT
Start: 2020-06-18 | End: 2020-06-18 | Stop reason: HOSPADM

## 2020-06-18 RX ORDER — SODIUM CHLORIDE 0.9 % (FLUSH) 0.9 %
3-10 SYRINGE (ML) INJECTION AS NEEDED
Status: DISCONTINUED | OUTPATIENT
Start: 2020-06-18 | End: 2020-06-18 | Stop reason: HOSPADM

## 2020-06-18 RX ORDER — LIDOCAINE HYDROCHLORIDE 10 MG/ML
0.5 INJECTION, SOLUTION EPIDURAL; INFILTRATION; INTRACAUDAL; PERINEURAL ONCE AS NEEDED
Status: DISCONTINUED | OUTPATIENT
Start: 2020-06-18 | End: 2020-06-18 | Stop reason: HOSPADM

## 2020-06-18 RX ORDER — NALOXONE HCL 0.4 MG/ML
0.4 VIAL (ML) INJECTION AS NEEDED
Status: DISCONTINUED | OUTPATIENT
Start: 2020-06-18 | End: 2020-06-18 | Stop reason: HOSPADM

## 2020-06-18 RX ORDER — ACETAMINOPHEN 500 MG
1000 TABLET ORAL ONCE
Status: COMPLETED | OUTPATIENT
Start: 2020-06-18 | End: 2020-06-18

## 2020-06-18 RX ORDER — ONDANSETRON 2 MG/ML
4 INJECTION INTRAMUSCULAR; INTRAVENOUS ONCE AS NEEDED
Status: DISCONTINUED | OUTPATIENT
Start: 2020-06-18 | End: 2020-06-18 | Stop reason: HOSPADM

## 2020-06-18 RX ORDER — MAGNESIUM HYDROXIDE 1200 MG/15ML
LIQUID ORAL AS NEEDED
Status: DISCONTINUED | OUTPATIENT
Start: 2020-06-18 | End: 2020-06-18 | Stop reason: HOSPADM

## 2020-06-18 RX ORDER — LABETALOL HYDROCHLORIDE 5 MG/ML
5 INJECTION, SOLUTION INTRAVENOUS
Status: DISCONTINUED | OUTPATIENT
Start: 2020-06-18 | End: 2020-06-18 | Stop reason: HOSPADM

## 2020-06-18 RX ORDER — ONDANSETRON 4 MG/1
4 TABLET, FILM COATED ORAL ONCE AS NEEDED
Status: DISCONTINUED | OUTPATIENT
Start: 2020-06-18 | End: 2020-06-18 | Stop reason: HOSPADM

## 2020-06-18 RX ORDER — ONDANSETRON 2 MG/ML
INJECTION INTRAMUSCULAR; INTRAVENOUS AS NEEDED
Status: DISCONTINUED | OUTPATIENT
Start: 2020-06-18 | End: 2020-06-18 | Stop reason: SURG

## 2020-06-18 RX ORDER — MIDAZOLAM HYDROCHLORIDE 1 MG/ML
1 INJECTION INTRAMUSCULAR; INTRAVENOUS
Status: COMPLETED | OUTPATIENT
Start: 2020-06-18 | End: 2020-06-18

## 2020-06-18 RX ORDER — ROCURONIUM BROMIDE 10 MG/ML
INJECTION, SOLUTION INTRAVENOUS AS NEEDED
Status: DISCONTINUED | OUTPATIENT
Start: 2020-06-18 | End: 2020-06-18 | Stop reason: SURG

## 2020-06-18 RX ORDER — FAMOTIDINE 10 MG/ML
20 INJECTION, SOLUTION INTRAVENOUS
Status: COMPLETED | OUTPATIENT
Start: 2020-06-18 | End: 2020-06-18

## 2020-06-18 RX ORDER — LIDOCAINE HYDROCHLORIDE 40 MG/ML
SOLUTION TOPICAL AS NEEDED
Status: DISCONTINUED | OUTPATIENT
Start: 2020-06-18 | End: 2020-06-18 | Stop reason: SURG

## 2020-06-18 RX ORDER — HYDROCODONE BITARTRATE AND ACETAMINOPHEN 5; 325 MG/1; MG/1
1 TABLET ORAL ONCE AS NEEDED
Status: DISCONTINUED | OUTPATIENT
Start: 2020-06-18 | End: 2020-06-18 | Stop reason: HOSPADM

## 2020-06-18 RX ORDER — FENTANYL CITRATE 50 UG/ML
25 INJECTION, SOLUTION INTRAMUSCULAR; INTRAVENOUS
Status: DISCONTINUED | OUTPATIENT
Start: 2020-06-18 | End: 2020-06-18 | Stop reason: HOSPADM

## 2020-06-18 RX ORDER — SODIUM CHLORIDE, SODIUM LACTATE, POTASSIUM CHLORIDE, CALCIUM CHLORIDE 600; 310; 30; 20 MG/100ML; MG/100ML; MG/100ML; MG/100ML
1000 INJECTION, SOLUTION INTRAVENOUS CONTINUOUS
Status: DISCONTINUED | OUTPATIENT
Start: 2020-06-18 | End: 2020-06-18 | Stop reason: HOSPADM

## 2020-06-18 RX ORDER — OXYCODONE AND ACETAMINOPHEN 7.5; 325 MG/1; MG/1
2 TABLET ORAL EVERY 4 HOURS PRN
Status: DISCONTINUED | OUTPATIENT
Start: 2020-06-18 | End: 2020-06-18 | Stop reason: HOSPADM

## 2020-06-18 RX ORDER — SODIUM CHLORIDE 9 MG/ML
100 INJECTION, SOLUTION INTRAVENOUS CONTINUOUS
Status: DISCONTINUED | OUTPATIENT
Start: 2020-06-18 | End: 2020-06-18 | Stop reason: HOSPADM

## 2020-06-18 RX ORDER — PROPOFOL 10 MG/ML
VIAL (ML) INTRAVENOUS AS NEEDED
Status: DISCONTINUED | OUTPATIENT
Start: 2020-06-18 | End: 2020-06-18 | Stop reason: SURG

## 2020-06-18 RX ORDER — OXYCODONE AND ACETAMINOPHEN 10; 325 MG/1; MG/1
1 TABLET ORAL ONCE AS NEEDED
Status: DISCONTINUED | OUTPATIENT
Start: 2020-06-18 | End: 2020-06-18 | Stop reason: HOSPADM

## 2020-06-18 RX ORDER — SODIUM CHLORIDE 0.9 % (FLUSH) 0.9 %
3 SYRINGE (ML) INJECTION EVERY 12 HOURS SCHEDULED
Status: DISCONTINUED | OUTPATIENT
Start: 2020-06-18 | End: 2020-06-18 | Stop reason: HOSPADM

## 2020-06-18 RX ORDER — BUPIVACAINE HCL/0.9 % NACL/PF 0.1 %
2 PLASTIC BAG, INJECTION (ML) EPIDURAL ONCE
Status: COMPLETED | OUTPATIENT
Start: 2020-06-18 | End: 2020-06-18

## 2020-06-18 RX ORDER — LIDOCAINE HYDROCHLORIDE 20 MG/ML
INJECTION, SOLUTION INFILTRATION; PERINEURAL AS NEEDED
Status: DISCONTINUED | OUTPATIENT
Start: 2020-06-18 | End: 2020-06-18 | Stop reason: SURG

## 2020-06-18 RX ADMIN — FENTANYL CITRATE 100 MCG: 50 INJECTION, SOLUTION INTRAMUSCULAR; INTRAVENOUS at 07:42

## 2020-06-18 RX ADMIN — ONDANSETRON HYDROCHLORIDE 4 MG: 2 SOLUTION INTRAMUSCULAR; INTRAVENOUS at 07:58

## 2020-06-18 RX ADMIN — PROPOFOL 180 MG: 10 INJECTION, EMULSION INTRAVENOUS at 07:42

## 2020-06-18 RX ADMIN — SUCCINYLCHOLINE CHLORIDE 140 MG: 20 INJECTION, SOLUTION INTRAMUSCULAR; INTRAVENOUS at 07:42

## 2020-06-18 RX ADMIN — LIDOCAINE HYDROCHLORIDE 1 EACH: 40 SOLUTION TOPICAL at 07:42

## 2020-06-18 RX ADMIN — DEXAMETHASONE SODIUM PHOSPHATE 4 MG: 4 INJECTION, SOLUTION INTRAMUSCULAR; INTRAVENOUS at 07:58

## 2020-06-18 RX ADMIN — ROCURONIUM BROMIDE 5 MG: 10 INJECTION INTRAVENOUS at 07:42

## 2020-06-18 RX ADMIN — SODIUM CHLORIDE, POTASSIUM CHLORIDE, SODIUM LACTATE AND CALCIUM CHLORIDE 1000 ML: 600; 310; 30; 20 INJECTION, SOLUTION INTRAVENOUS at 06:41

## 2020-06-18 RX ADMIN — LIDOCAINE HYDROCHLORIDE 80 MG: 20 INJECTION, SOLUTION INFILTRATION; PERINEURAL at 07:42

## 2020-06-18 RX ADMIN — ACETAMINOPHEN 1000 MG: 500 TABLET, FILM COATED ORAL at 07:07

## 2020-06-18 RX ADMIN — Medication 2 G: at 07:49

## 2020-06-18 RX ADMIN — MIDAZOLAM HYDROCHLORIDE 1 MG: 1 INJECTION, SOLUTION INTRAMUSCULAR; INTRAVENOUS at 07:22

## 2020-06-18 RX ADMIN — MIDAZOLAM HYDROCHLORIDE 1 MG: 1 INJECTION, SOLUTION INTRAMUSCULAR; INTRAVENOUS at 07:38

## 2020-06-18 RX ADMIN — FAMOTIDINE 20 MG: 10 INJECTION, SOLUTION INTRAVENOUS at 07:07

## 2020-06-18 NOTE — OP NOTE
CYSTOSCOPY BLADDER BIOPSY WITH FULGURATION  Procedure Note    Sherly Jones  6/18/2020    Pre-op Diagnosis:   Lesion of bladder [N32.9]    Post-op Diagnosis:     Post-Op Diagnosis Codes:     * Lesion of bladder [N32.9]    Procedure/CPT® Codes:      Procedure(s):  CYSTOSCOPY BLADDER BIOPSY WITH FULGURATION    Surgeon(s):  Jose Francisco Garcia MD    Anesthesia: General    Staff:   Circulator: Millie Washburn, RN; Isma Andrew RN; Wayne March RN  Scrub Person: Zi Bales    Estimated Blood Loss: minimal    Specimens:                Specimens     ID Source Type Tests Collected By Collected At Frozen?      A Urinary Bladder Tissue · TISSUE PATHOLOGY EXAM   Jose Francisco Garcia MD 6/18/20 0759      Description: bladder dome    B Urinary Bladder Tissue · TISSUE PATHOLOGY EXAM   Jose Francisco Garcia MD 6/18/20 0800 No     Description: bladder posterior wall    C Urinary Bladder Tissue · TISSUE PATHOLOGY EXAM   Jose Francisco Garcia MD 6/18/20 0801 No     Description: bladder left lateral wall            Drains: * No LDAs found *    Findings: Flat erythematous lesions along the lateral left wall posterior wall and dome of bladder  3 biopsies taken representing the dome posterior wall and lateral wall  Ureteral orifices in normal orthotopic location    Complications: None    Indications: 55-year-old female with interstitial cystitis type symptoms who underwent cystoscopy in the office which showed flat erythematous areas throughout the bladder.  She is brought the operating for cystoscopy bladder biopsy and fulguration to ensure that these were not carcinoma in situ.    Description of Procedure: After informed symptoms obtained the patient brought the operating room where she underwent general endotracheal anesthesia in supine position.  She was converted the dorsolithotomy position.  She was prepped and draped in the usual sterile fashion.  Timeout was done to ensure the correct patient procedure  and site.  She did receive preoperative antibiotics in the holding area.    22 Israeli Storz endoscope inserted urethra in retrograde fashion.  The bladder was then thoroughly and carefully methodically inspected.  The findings are as dictated above.  I then biopsied the dome the posterior wall bladder wall in that order.  Areas were fulgurated.  Total area biopsied was <2cm.  I then turned the irrigation off and there was no further bleeding noted.  Bladder was emptied and drained.  Scope was removed.  The patient was awakened from anesthesia and transferred recovery in satisfactory condition.    Jose Francisco Garcia MD     Date: 6/18/2020  Time: 08:19

## 2020-06-18 NOTE — DISCHARGE INSTRUCTIONS

## 2020-06-18 NOTE — ANESTHESIA PREPROCEDURE EVALUATION
Anesthesia Evaluation     Patient summary reviewed and Nursing notes reviewed   no history of anesthetic complications:  NPO Solid Status: > 8 hours  NPO Liquid Status: > 8 hours           Airway   Mallampati: I  TM distance: >3 FB  Neck ROM: full  No difficulty expected  Dental      Pulmonary    (+) a smoker (switched to vape 7 years ago) Current, asthma (exercise induced asthma),sleep apnea,   Cardiovascular   Exercise tolerance: good (4-7 METS)    (+) dysrhythmias (bigeminy),   (-) hypertension, CAD    ROS comment: Recent stress test and echo wnl per patient, [facility]    Neuro/Psych  (+) CVA (due to OCP age 21),     GI/Hepatic/Renal/Endo    (-) liver disease, no renal disease, diabetes    Musculoskeletal     Abdominal    Substance History      OB/GYN          Other   arthritis,    history of cancer (cervical cancer) remission                    Anesthesia Plan    ASA 3     general   Rapid sequence(Pt complaining of current gerd, will pretx with pepcid and perform RSI)  intravenous induction     Anesthetic plan, all risks, benefits, and alternatives have been provided, discussed and informed consent has been obtained with: patient.

## 2020-06-18 NOTE — ANESTHESIA PROCEDURE NOTES
Airway  Urgency: elective    Date/Time: 6/18/2020 7:43 AM  Airway not difficult    General Information and Staff    Patient location during procedure: OR  CRNA: Niall Whittington CRNA    Indications and Patient Condition  Indications for airway management: airway protection    Preoxygenated: yes  Mask difficulty assessment: 0 - not attempted    Final Airway Details  Final airway type: endotracheal airway      Successful airway: ETT  Cuffed: yes   Successful intubation technique: direct laryngoscopy and RSI  Facilitating devices/methods: anterior pressure/BURP and cricoid pressure  Endotracheal tube insertion site: oral  Blade: Blanche  Blade size: 3  ETT size (mm): 7.5  Cormack-Lehane Classification: grade I - full view of glottis  Placement verified by: chest auscultation, capnometry and palpation of cuff   Measured from: lips  ETT/EBT  to lips (cm): 22  Number of attempts at approach: 1  Assessment: lips, teeth, and gum same as pre-op and atraumatic intubation

## 2020-06-18 NOTE — ANESTHESIA POSTPROCEDURE EVALUATION
Patient: Sherly Jones    Procedure Summary     Date:  06/18/20 Room / Location:   [facility]  /  [facility]    Anesthesia Start:  0738 Anesthesia Stop:  0816    Procedure:  CYSTOSCOPY BLADDER BIOPSY WITH FULGURATION (N/A Bladder) Diagnosis:       Lesion of bladder      (Lesion of bladder [N32.9])    Surgeon:  Jose Francisco Garcia MD Provider:  Niall Whittington CRNA    Anesthesia Type:  general ASA Status:  3          Anesthesia Type: general    Vitals  Vitals Value Taken Time   /66 6/18/2020  8:30 AM   Temp 97.6 °F (36.4 °C) 6/18/2020  8:30 AM   Pulse 83 6/18/2020  8:32 AM   Resp 12 6/18/2020  8:30 AM   SpO2 97 % 6/18/2020  8:32 AM   Vitals shown include unvalidated device data.        Post Anesthesia Care and Evaluation    Patient location during evaluation: PACU  Patient participation: complete - patient participated  Level of consciousness: awake and alert  Pain management: adequate  Airway patency: patent  Anesthetic complications: No anesthetic complications    Cardiovascular status: acceptable  Respiratory status: acceptable  Hydration status: acceptable    Comments: Blood pressure 122/64, pulse 79, temperature 97.6 °F (36.4 °C), temperature source Temporal, resp. rate 16, SpO2 96 %, not currently breastfeeding.    Pt discharged from PACU based on lawson score >8

## 2020-06-19 LAB
CYTO UR: NORMAL
LAB AP CASE REPORT: NORMAL
LAB AP INTRADEPARTMENTAL CONSULT: NORMAL
PATH REPORT.FINAL DX SPEC: NORMAL
PATH REPORT.GROSS SPEC: NORMAL

## 2021-05-24 DIAGNOSIS — R39.15 URGENCY OF URINATION: ICD-10-CM

## 2021-05-24 DIAGNOSIS — R35.0 FREQUENCY OF URINATION: ICD-10-CM

## 2021-05-25 RX ORDER — MIRABEGRON 50 MG/1
TABLET, FILM COATED, EXTENDED RELEASE ORAL
Qty: 30 TABLET | Refills: 0 | Status: SHIPPED | OUTPATIENT
Start: 2021-05-25 | End: 2021-06-23

## 2021-05-25 NOTE — TELEPHONE ENCOUNTER
Requested Prescriptions     Pending Prescriptions Disp Refills   • Myrbetriq 50 MG tablet sustained-release 24 hour 24 hr tablet [Pharmacy Med Name: Myrbetriq 50 MG Oral Tablet Extended Release 24 Hour] 30 tablet 0     Sig: Take 1 tablet by mouth once daily

## 2021-06-23 DIAGNOSIS — R35.0 FREQUENCY OF URINATION: ICD-10-CM

## 2021-06-23 DIAGNOSIS — R39.15 URGENCY OF URINATION: ICD-10-CM

## 2021-06-23 RX ORDER — MIRABEGRON 50 MG/1
TABLET, FILM COATED, EXTENDED RELEASE ORAL
Qty: 30 TABLET | Refills: 0 | Status: SHIPPED | OUTPATIENT
Start: 2021-06-23 | End: 2021-07-29

## 2021-07-21 DIAGNOSIS — R39.15 URGENCY OF URINATION: ICD-10-CM

## 2021-07-21 DIAGNOSIS — R35.0 FREQUENCY OF URINATION: ICD-10-CM

## 2021-07-21 RX ORDER — MIRABEGRON 50 MG/1
TABLET, FILM COATED, EXTENDED RELEASE ORAL
Qty: 30 TABLET | Refills: 0 | OUTPATIENT
Start: 2021-07-21

## 2021-07-29 DIAGNOSIS — R35.0 FREQUENCY OF URINATION: ICD-10-CM

## 2021-07-29 DIAGNOSIS — R39.15 URGENCY OF URINATION: ICD-10-CM

## 2021-07-29 RX ORDER — MIRABEGRON 50 MG/1
TABLET, FILM COATED, EXTENDED RELEASE ORAL
Qty: 30 TABLET | Refills: 11 | Status: SHIPPED | OUTPATIENT
Start: 2021-07-29 | End: 2022-08-19

## 2022-08-19 ENCOUNTER — OFFICE VISIT (OUTPATIENT)
Dept: SLEEP MEDICINE | Facility: HOSPITAL | Age: 57
End: 2022-08-19

## 2022-08-19 VITALS
DIASTOLIC BLOOD PRESSURE: 76 MMHG | BODY MASS INDEX: 25.49 KG/M2 | HEIGHT: 65 IN | HEART RATE: 80 BPM | SYSTOLIC BLOOD PRESSURE: 123 MMHG | OXYGEN SATURATION: 98 % | WEIGHT: 153 LBS

## 2022-08-19 DIAGNOSIS — G47.19 EXCESSIVE DAYTIME SLEEPINESS: ICD-10-CM

## 2022-08-19 DIAGNOSIS — G47.00 FREQUENT NOCTURNAL AWAKENING: ICD-10-CM

## 2022-08-19 DIAGNOSIS — R35.1 NOCTURIA: ICD-10-CM

## 2022-08-19 DIAGNOSIS — G47.33 OBSTRUCTIVE SLEEP APNEA, ADULT: Primary | ICD-10-CM

## 2022-08-19 PROCEDURE — 99215 OFFICE O/P EST HI 40 MIN: CPT | Performed by: NURSE PRACTITIONER

## 2022-08-19 RX ORDER — MONTELUKAST SODIUM 10 MG/1
TABLET ORAL
COMMUNITY
Start: 2022-08-02 | End: 2023-01-03

## 2022-08-19 RX ORDER — FLUTICASONE PROPIONATE 50 MCG
SPRAY, SUSPENSION (ML) NASAL
COMMUNITY
Start: 2022-07-08

## 2022-08-19 NOTE — PROGRESS NOTES
New Patient Sleep Medicine Consultation    Encounter Date: 8/19/2022         Patient's Primary Care Provider: Freya Bourne MD  Referring Provider: No ref. provider found  Reason for consultation/chief complaint: known SHARITA on CPAP, not controlled with current settings    Sherly Jones is a 57 y.o. female who admits to unrestful sleep, decreased libido, excessive daytime sleepiness, morning headaches, stopping breathing during sleep, irritability, disturbed or restless sleep, memory loss, up to bathroom at night, sleepy driving, restless legs at night, difficulty falling asleep and difficulty staying asleep.    She denies cataplexy, sleep paralysis, or hypnagogic hallucinations. Her bedtime is ~ 2200. She  falls asleep after 1-5 minutes, and is up 6-7 times per night. She wakes up ~ 0600. She endorses 7-8 hours of sleep. She drinks 2 cups of coffee, 0 teas, and 0 sodas per day. She drinks 0 alcoholic beverages per week. She is a current vape smoker. She does not take sedatives or hypnotics but does use herbal teas at night. She has occasional sleepiness with driving. She rarely naps.    Patient has been having issues with chronic cystitis and is up to the bathroom 5-7 times per night. She has tried multiple medications for this issue.  Patient had gastric sleeve procedure >7 years ago.     Patient has been on CPAP for many years and has not had appropriate control of AHI on any pressure range. She has been on set pressure of 11, 12, and 15 cm H2O, as well as autoCPAP ranges, and autoBiPAP for a short time. She has had multiple studies and titration studies. Most recently, she has been on autoCPAP 12-20 cm H2O and an increasing AHI. She has received a [employer] replacement machine. Increasing AHI did not happen immediately after she started using the DreamStation 2 machine, but increased events did begin after switching from a set pressure of 11 cm H2O to an autoCPAP range. Even at the set pressure  of 11 cm H2O, she still had suboptimal control of AHI.   Upon review of her most recent titration study from 2019, patient appeared to have control of apneic events at 9 cm H2O.   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 She has been using a nasal cushion mask, and this may be causing partial nasal obstruction of the left nare. She does not think that she has tried nasal pillow mask in the past, and this could eliminate possible issues if nasal valve collapse is contributing to increased AHI.    After discussion with the patient regarding treatment options, I have decided to decrease patient's CPAP pressure to set 9 cm H2O and recommend using nasal pillows. I will check her compliance report in ~2 weeks and monitor AHI.    [place] - 13  [place] Sleepiness Scale  Sitting and reading: High chance of dozing  Watching TV: Moderate chance of dozing  Sitting, inactive in a public place (e.g. a theatre or a meeting): Slight chance of dozing  As a passenger in a car for an hour without a break: Moderate chance of dozing  Lying down to rest in the afternoon when circumstances permit: High chance of dozing  Sitting and talking to someone: Would never doze  Sitting quietly after a lunch without alcohol: Moderate chance of dozing  In a car, while stopped for a few minutes in traffic: Would never doze  Total score: 13    Prior Sleep Testin. PSG on 2010, AHI of 23.4   2. CPAP titration on 2010, recommended 12 cm H2O   3. PSG on 2013 with ProVent, AHI of 9.2 - started on autoBiPAP at some point  4. Repeat PAP titration on 2019, recommended 14 cm H2O  5. HST , unsure of AHI but still mild SHARITA  6. Currently on 12-20 cm H2O    PAP Data:    Time frame: 2022-2022   Compliance 96.4%  Average use on days used: 7 hrs 27 min  Percent of days with usage greater than or equal to 4 hours: 94.1%  PAP range : 12-20 cm H2O  Average 90% pressure: 17 cmH2O  Leak: 4 minutes  Average AHI:  14.1 events/hr  RUBY: 1.7 events/hr  PB: 1.1%  Mask type: Nasal cushion  DME: [facility]  Machine type: clipkit [employer] DreamStation 2      Past Medical History:   Diagnosis Date   • Asthma     exercised enduced asthma   • Bigeminy    • Cervical cancer (HCC)    • Herpes genitalis    • Pelvic pain    • Sleep apnea     cpap   • Stroke (HCC)     at age 21     Social History     Socioeconomic History   • Marital status:    Tobacco Use   • Smoking status: Current Some Day Smoker     Types: Electronic Cigarette   • Smokeless tobacco: Never Used   • Tobacco comment: vap   Substance and Sexual Activity   • Alcohol use: No   • Drug use: No   • Sexual activity: Defer     History reviewed. No pertinent family history.     Prior T&A, UPPP, maxillofacial, or bariatric surgery: Had nasal polyp removed in right nare 25 years ago  Family history of sleep disorders: -  Other family history + for: -  Occupation: Cardiopulmonary manager  Marital status: Unmarried  Children: 0  Has 2 brothers and 4 sisters  Smoking history: smoked 1 ppds from age 11 until 48, currently vapes      Review of Systems:  Constitutional: positive for fatigue  Ears, nose, mouth, throat, and face: negative  Respiratory: negative  Cardiovascular: negative  Gastrointestinal: negative  Genitourinary:positive for frequency and nocturia  Musculoskeletal:negative  Neurological: negative  Behavioral/Psych: positive for sleep disturbance  Patient advised to discuss any positive ROS with PCP.      Vitals:    08/19/22 0922   BP: 123/76   Pulse: 80   SpO2: 98%           08/19/22 0922   Weight: 69.4 kg (153 lb)       Body mass index is 25.34 kg/m². BMI is >= 25 and <30. (Overweight) The following options were offered after discussion;: referral to primary care    Tobacco Use: High Risk   • Smoking Tobacco Use: Current Some Day Smoker   • Smokeless Tobacco Use: Never Used     Physical Exam:        General: Alert. Cooperative. Well developed. No acute  distress.   Head/Neck:  Normocephalic. Symmetrical. Atraumatic.              Eyes: Sclera clear. No icterus. PERRLA. Normal EOM.             Ears: No deformities. Normal hearing.             Nose: Weak left nostril/nasal valve. No septal deviation. No drainage.          Throat: No oral lesions. Moist mucous membranes. Trachea midline.    Tongue is normal    Chest Wall:  Normal shape. Symmetric expansion with respiration. No tenderness.          Lungs:  Clear to auscultation bilaterally. No wheezes. No rhonchi. No rales. Respirations regular, even and unlabored.            Heart:  Regular rhythm and normal rate. Normal S1 and S2. No murmur.     Abdomen:  Soft, non-tender and non-distended. Normal bowel sounds. No masses.  Extremities:  Moves all extremities well. No edema.               Skin: Dry. Intact. No bleeding. No rash.           Neuro: Moves all 4 extremities and cranial nerves grossly intact.  Psychiatric: Normal mood and affect.      Current Outpatient Medications:   •  acyclovir (ZOVIRAX) 400 MG tablet, Take 400 mg by mouth Daily., Disp: , Rfl:   •  albuterol sulfate  (90 Base) MCG/ACT inhaler, Inhale 1 puff As Needed., Disp: , Rfl:   •  buPROPion SR (WELLBUTRIN SR) 150 MG 12 hr tablet, Take 150 mg by mouth 2 (Two) Times a Day., Disp: , Rfl:   •  citalopram (CeleXA) 40 MG tablet, Take 40 mg by mouth Daily., Disp: , Rfl:   •  cyclobenzaprine (FLEXERIL) 10 MG tablet, Take 10 mg by mouth As Needed., Disp: , Rfl:   •  fexofenadine-pseudoephedrine (ALLEGRA-D)  MG per 12 hr tablet, Take 1 tablet by mouth As Needed., Disp: , Rfl:   •  fluticasone (FLONASE) 50 MCG/ACT nasal spray, , Disp: , Rfl:   •  ipratropium-albuterol (COMBIVENT RESPIMAT)  MCG/ACT inhaler, Inhale 1 puff 4 (Four) Times a Day As Needed for Wheezing., Disp: , Rfl:   •  ondansetron ODT (ZOFRAN-ODT) 4 MG disintegrating tablet, Place 4 mg on the tongue As Needed., Disp: , Rfl: 1  •  Probiotic Product (PROBIOTIC DAILY PO), Take  by  mouth Daily., Disp: , Rfl:   •  montelukast (SINGULAIR) 10 MG tablet, , Disp: , Rfl:   •  Myrbetriq 50 MG tablet sustained-release 24 hour 24 hr tablet, Take 1 tablet by mouth once daily, Disp: 30 tablet, Rfl: 11  •  phenazopyridine (PYRIDIUM) 100 MG tablet, Take 1 tablet by mouth 3 (Three) Times a Day As Needed for Bladder Spasms., Disp: 21 tablet, Rfl: 1    WBC   Date Value Ref Range Status   06/09/2020 7.65 3.40 - 10.80 10*3/mm3 Final     RBC   Date Value Ref Range Status   06/09/2020 4.56 3.77 - 5.28 10*6/mm3 Final     Hemoglobin   Date Value Ref Range Status   06/09/2020 12.8 12.0 - 15.9 g/dL Final     Hematocrit   Date Value Ref Range Status   06/09/2020 40.2 34.0 - 46.6 % Final     MCV   Date Value Ref Range Status   06/09/2020 88.2 79.0 - 97.0 fL Final     MCH   Date Value Ref Range Status   06/09/2020 28.1 26.6 - 33.0 pg Final     MCHC   Date Value Ref Range Status   06/09/2020 31.8 31.5 - 35.7 g/dL Final     RDW   Date Value Ref Range Status   06/09/2020 13.8 12.3 - 15.4 % Final     RDW-SD   Date Value Ref Range Status   06/09/2020 44.7 37.0 - 54.0 fl Final     MPV   Date Value Ref Range Status   06/09/2020 10.3 6.0 - 12.0 fL Final     Platelets   Date Value Ref Range Status   06/09/2020 261 140 - 450 10*3/mm3 Final     Neutrophil %   Date Value Ref Range Status   11/19/2019 58.5 42.7 - 76.0 % Final     Lymphocyte %   Date Value Ref Range Status   11/19/2019 30.9 19.6 - 45.3 % Final     Monocyte %   Date Value Ref Range Status   11/19/2019 6.9 5.0 - 12.0 % Final     Eosinophil %   Date Value Ref Range Status   11/19/2019 2.7 0.3 - 6.2 % Final     Basophil %   Date Value Ref Range Status   11/19/2019 0.6 0.0 - 1.5 % Final     Immature Grans %   Date Value Ref Range Status   11/19/2019 0.4 0.0 - 0.5 % Final     Neutrophils, Absolute   Date Value Ref Range Status   11/19/2019 6.16 1.70 - 7.00 10*3/mm3 Final     Lymphocytes, Absolute   Date Value Ref Range Status   11/19/2019 3.25 (H) 0.70 - 3.10 10*3/mm3  Final     Monocytes, Absolute   Date Value Ref Range Status   11/19/2019 0.72 0.10 - 0.90 10*3/mm3 Final     Eosinophils, Absolute   Date Value Ref Range Status   11/19/2019 0.28 0.00 - 0.40 10*3/mm3 Final     Basophils, Absolute   Date Value Ref Range Status   11/19/2019 0.06 0.00 - 0.20 10*3/mm3 Final     Immature Grans, Absolute   Date Value Ref Range Status   11/19/2019 0.04 0.00 - 0.05 10*3/mm3 Final     nRBC   Date Value Ref Range Status   11/19/2019 0.0 0.0 - 0.2 /100 WBC Final     Lab Results   Component Value Date    GLUCOSE 111 (H) 06/09/2020    BUN 15 06/09/2020    CREATININE 0.66 06/09/2020    EGFRIFNONA 93 06/09/2020    BCR 22.7 06/09/2020    K 4.0 06/09/2020    CO2 28.0 06/09/2020    CALCIUM 9.8 06/09/2020    ALBUMIN 4.40 11/19/2019    AST 19 11/19/2019    ALT 21 11/19/2019       ASSESSMENT:  1. Obstructive sleep apnea - New (to me), no additional work-up planned (3)  1. Change CPAP pressure to 9 cm H2O  2. Try nasal pillows  3. Check compliance report in ~2 weeks to monitor AHI  4. Follow up within 6 months, or sooner if ongoing/worsening apnea  2. Frequent nocturnal awakenings, nocturia due to chronic cystitis - New (to me), no additional work-up planned (3)  1. Continue following with PCP/specialty      I spent 40 minutes caring for Sherly on this date of service. This time includes time spent by me in the following activities: preparing for the visit, reviewing tests, obtaining and/or reviewing a separately obtained history, performing a medically appropriate examination and/or evaluation , counseling and educating the patient/family/caregiver, documenting information in the medical record and care coordination; discussing PAP therapy    RTC in 6 months. Patient agrees to return sooner if changes in symptoms.         This document has been electronically signed by KRYSTINA Elizalde on August 19, 2022 09:26 CDT          CC: Freya Bourne MD          No ref. provider found

## 2022-08-31 ENCOUNTER — DOCUMENTATION (OUTPATIENT)
Dept: SLEEP MEDICINE | Facility: HOSPITAL | Age: 57
End: 2022-08-31

## 2022-08-31 NOTE — PROGRESS NOTES
Chart reminder to check CPAP compliance report after setting pressure to 9 cm H2O.    PAP Data:    Time frame: 08/19/2022-08/30/2022   Compliance 100%  Average use on days used: 7 hrs 3 min  Percent of days with usage greater than or equal to 4 hours: 100%  PAP range : 9 cm H2O  Average 90% pressure: 9 cmH2O  Leak: 1 minutes  Average AHI: 7.7 events/hr (hypopnea index 3.4)  RUBY: 1.9 events/hr  Mask type: Nasal pillows  DME: [facility]  Machine type: [employer] 2    Called patient to discuss results. AHI is improving from 14.1 events/hr. Patient feels much better with her current pressure settings and says that she is not waking up with headaches. She was given an incorrect nasal pillow size and received a new one yesterday. I will set a chart reminder to check compliance again in ~3 weeks and will call patient. Will consider further pressure adjustment if needed.

## 2022-09-21 ENCOUNTER — DOCUMENTATION (OUTPATIENT)
Dept: SLEEP MEDICINE | Facility: HOSPITAL | Age: 57
End: 2022-09-21

## 2022-09-21 NOTE — PROGRESS NOTES
Chart reminder to re-check patient's compliance report after changing to nasal pillows and getting correct size.     PAP Data:  Time frame: 08/31/2022-09/20/2022   Compliance 95.2%  Average use on days used: 6 hrs 50 min  Percent of days with usage greater than or equal to 4 hours: 90.5%  PAP range : 9 cm H2O  Average 90% pressure: 9 cmH2O  Leak: 2 minutes  Average AHI: 7.8 events/hr  RUBY: 1.9 events/hr  Mask type: Nasal pillows  DME: [facility]  Machine type: [employer] DreamStation 2    PAP Data:  Time frame: 09/09/2022-09/20/2022   Compliance 91.7%  Average use on days used: 6 hrs 31 min  Percent of days with usage greater than or equal to 4 hours: 83.3%  PAP range : 9 cm H2O  Average 90% pressure: 9 cmH2O  Leak: 3 minutes  Average AHI: 6.8 events/hr  RUBY: 2 events/hr      Apnea continues to decrease but remains slightly elevated. This is an improvement from >14 events/hr. Will continue with current settings as patient has a follow up in February. Will message patient and make her aware of findings and plan.

## 2023-01-03 ENCOUNTER — OFFICE VISIT (OUTPATIENT)
Dept: OBSTETRICS AND GYNECOLOGY | Facility: CLINIC | Age: 58
End: 2023-01-03
Payer: COMMERCIAL

## 2023-01-03 VITALS
HEIGHT: 65 IN | DIASTOLIC BLOOD PRESSURE: 78 MMHG | WEIGHT: 168.2 LBS | BODY MASS INDEX: 28.02 KG/M2 | SYSTOLIC BLOOD PRESSURE: 128 MMHG

## 2023-01-03 DIAGNOSIS — Z12.4 SCREENING FOR MALIGNANT NEOPLASM OF CERVIX: ICD-10-CM

## 2023-01-03 DIAGNOSIS — Z01.411 ENCOUNTER FOR GYNECOLOGICAL EXAMINATION WITH ABNORMAL FINDING: Primary | ICD-10-CM

## 2023-01-03 DIAGNOSIS — N30.10 INTERSTITIAL CYSTITIS: ICD-10-CM

## 2023-01-03 PROCEDURE — G0123 SCREEN CERV/VAG THIN LAYER: HCPCS | Performed by: OBSTETRICS & GYNECOLOGY

## 2023-01-03 PROCEDURE — 87624 HPV HI-RISK TYP POOLED RSLT: CPT | Performed by: OBSTETRICS & GYNECOLOGY

## 2023-01-03 PROCEDURE — 99386 PREV VISIT NEW AGE 40-64: CPT | Performed by: OBSTETRICS & GYNECOLOGY

## 2023-01-03 RX ORDER — AMITRIPTYLINE HYDROCHLORIDE 10 MG/1
10 TABLET, FILM COATED ORAL NIGHTLY
Qty: 30 TABLET | Refills: 1 | Status: SHIPPED | OUTPATIENT
Start: 2023-01-03 | End: 2023-02-02

## 2023-01-03 NOTE — PROGRESS NOTES
Hayden Gomez MD  [facility]  [address]  [place] [address]  Office [phone]  Fax [phone]      [facility]  Sherly Jones  : 1965  MRN: [id number]    Subjective   Subjective     Chief Complaint   Patient presents with   • Gynecologic Exam     Patient here to establish care. Patient also here with complains of bladder prolapse. Patient denies leaking, patient has to bare down to urinate. Patient also has nocturia- 8 times a night. Patient states getting worse for past 4 months or so. Patient states PCP did an exam and told her stage 2. Patient states no pap in the last year- hysterectomy. Cervical cancer at age 21. Mammogram done last week. Patient also had colonoscopy 2 years ago.       History of Present Illness  Sherly Jones is a 57 y.o. female , , who comes to the office today for establishing care as well as bladder pain/possible prolapse.  GYN history significant for cervical cancer at age 21 status post CKC which resolved her cervical cancer, history of oophorectomy secondary to ruptured ovarian cyst requiring emergent surgery, history of oophorectomy with salpingectomy and hysterectomy secondary to pelvic mass on the fallopian tube that resulted as benign back in .  Mammogram was recently performed.  Denies any breast complaints.  Reports worsening bladder pain for the past several months.  She has been followed by urology in the past in  with biopsy secondary to possible bladder mass.  This returned as benign.  She has been on multiple medications for incontinence since then without any improvement in her symptoms.  She denies urinary incontinence currently.  Reports nocturia as well as pain with bladder filling and emptying.  States gets the urge to go but never misses the toilet.  Pain in the bladder fluctuates and is currently okay today.  She is not sexually active and has not been for several months.      Review of Systems    Genitourinary: Positive for frequency and pelvic pain. Negative for decreased urine volume, difficulty urinating, dyspareunia, dysuria, enuresis, flank pain, genital sores, hematuria, menstrual problem, urgency, vaginal bleeding, vaginal discharge and vaginal pain.   All other systems reviewed and are negative.       OB hx:  OB History    Para Term  AB Living   0 0 0 0 0 0   SAB IAB Ectopic Molar Multiple Live Births   0 0 0 0 0 0        GYN hx:  Date of LMP: No LMP recorded. Patient has had a hysterectomy.  Date/Result of last Pap smear: she reports her last PAP was normal and her last PAP is not available to confirm her report of the results    History of abnormal PAP: Yes. Details: h/o cervical cancer at age 21  Date/Result of last mammogram: was done on approximately 1 week ago and the result was: unknown.  History of Abnormal Mammogram: No  Date/Result of last colonoscopy: Repeat colonoscopy recommended in 8 years  History of Abnormal colonoscopy: No  Date/Result of last DEXA: None  History of Abnormal DEXA: No  HPV Vaccination: No  History of Cervical Dysplasia: Yes. Details: h/o cervical cancer at age 21  History of Vulvar Dysplasia: No  History of Sexually Transmitted Infection: No  Current Birth Control Method: none  HRT: Yes. Details: Attempted 2 years of hormone replacement therapy in the remote past but states she could not tolerate the side effects  Sexually active: No   FMH of Breast, Uterine, Ovarian, or Colon cancer: Yes. Details:   -breast cancer mother  -colon cancer maternal grandmother  Other OB/GYN History:   -[facility],  for cervical cancer  -Laparoscopic left oophorectomy , ruptured ovarian cyst, emergent  -Laparoscopy  for endometriosis  -Hysterectomy,  with bilateral salpingectomy and oophorectomy secondary to pelvic mass; mass was dilated fallopian tube per patient and was benign      Personal History     The following portions of the patient's history were  reviewed and updated as appropriate: allergies, current medications, past family history, past medical history, past social history, past surgical history and problem list.    History Review Reviewed Comments   Past Medical History:  [x]   SHARITA on CPAP, history of stroke at age 19, history of drug addiction in recovery, exercise-induced asthma, bigeminy with exercise   Past Surgical History: [x]   Gastric sleeve   Family History: [x]     Social History: [x]       Current Outpatient Medications   Medication Instructions   • acyclovir (ZOVIRAX) 400 mg, Oral, Daily   • albuterol sulfate  (90 Base) MCG/ACT inhaler 1 puff, Inhalation, As Needed   • amitriptyline (ELAVIL) 10 mg, Oral, Nightly   • buPROPion SR (WELLBUTRIN SR) 150 mg, Oral, 2 Times Daily   • citalopram (CELEXA) 40 mg, Oral, Daily   • fluticasone (FLONASE) 50 MCG/ACT nasal spray No dose, route, or frequency recorded.   • ipratropium-albuterol (COMBIVENT RESPIMAT)  MCG/ACT inhaler 1 puff, Inhalation, 4 Times Daily PRN   • Probiotic Product (PROBIOTIC DAILY PO) Oral, Daily       Allergies   Allergen Reactions   • Tomato Extract Allergy Skin Test Anaphylaxis   • Prozac [Fluoxetine Hcl] Hives       Objective    Objective     Vitals:   Visit Vitals  /78   Ht 165.1 cm (65\")   Wt 76.3 kg (168 lb 3.2 oz)   BMI 27.99 kg/m²        Physical Exam  Vitals and nursing note reviewed. Exam conducted with a chaperone present.   Constitutional:       General: She is not in acute distress.     Appearance: Normal appearance. She is not ill-appearing.   HENT:      Head: Normocephalic and atraumatic.      Nose: No congestion or rhinorrhea.   Eyes:      General: No scleral icterus.        Right eye: No discharge.         Left eye: No discharge.      Extraocular Movements: Extraocular movements intact.      Conjunctiva/sclera: Conjunctivae normal.   Pulmonary:      Effort: Pulmonary effort is normal. No accessory muscle usage or respiratory distress.    Genitourinary:     General: Normal vulva.      Exam position: Lithotomy position.      Pubic Area: No rash or pubic lice.       Labia:         Right: No rash, tenderness or lesion.         Left: No rash, tenderness or lesion.       Urethra: No prolapse or urethral lesion.      Vagina: No signs of injury and foreign body. Tenderness present. No vaginal discharge, erythema, bleeding, lesions or prolapsed vaginal walls.      Uterus: Absent.       Adnexa: Right adnexa normal and left adnexa normal.      Rectum: No external hemorrhoid.                Comments: Consent for exam obtained verbally from patient.  Musculoskeletal:      Right lower leg: No edema.      Left lower leg: No edema.   Skin:     General: Skin is warm and dry.      Coloration: Skin is not ashen, cyanotic or jaundiced.   Neurological:      General: No focal deficit present.      Mental Status: She is alert and oriented to person, place, and time.   Psychiatric:         Mood and Affect: Mood normal.         Behavior: Behavior is cooperative.           Assessment & Plan   Assessment / Plan     Diagnoses and all orders for this visit:    1. Encounter for gynecological examination without abnormal finding (Primary)    2. Screening for malignant neoplasm of cervix  -     Liquid-based Pap Smear, Screening    3. Screening examination for venereal disease    4. Interstitial cystitis  -     amitriptyline (ELAVIL) 10 MG tablet; Take 1 tablet by mouth Every Night for 30 days.  Dispense: 30 tablet; Refill: 1        Discussion:   BMI Body mass index is 27.99 kg/m²..   Colonoscopy: Up to date.    Mammogram: Up to date.  DEXA:  Will be done at next visit.  Pap smear, per ASCCP guidelines, Done today.  STI screening: declines  Contraception: declines    Encouraged self breast awareness.  Encouraged proactive weight management and importance of maintaining a healthy weight.   Encouraged regular exercise and the importance of same, in regards to a healthy heart as  well as helping to maintain her weight and improving her mental health.      Clinical exam and findings reviewed with the patient.  She does not have prolapse.  She has a very small vaginal caliber which required small speculum.  Recommend for future exams using the smaller pediatric speculum.  No prolapse was appreciated on the exam today.  She does have significant tenderness of her bladder which coincides with her story.  Discussed treatment options for interstitial cystitis including bladder installations.  Following discussion, she wants to try amitriptyline at this time and possibly proceed with bladder installations if not with relief.    Follow-up: Return in about 6 weeks (around 2/14/2023) for GYN f/u.    Hayden Gomez MD

## 2023-01-05 LAB
GEN CATEG CVX/VAG CYTO-IMP: NORMAL
HPV I/H RISK 4 DNA CVX QL PROBE+SIG AMP: NOT DETECTED
LAB AP CASE REPORT: NORMAL
LAB AP GYN ADDITIONAL INFORMATION: NORMAL
LAB AP GYN OTHER FINDINGS: NORMAL
Lab: NORMAL
PATH INTERP SPEC-IMP: NORMAL
STAT OF ADQ CVX/VAG CYTO-IMP: NORMAL

## 2023-01-10 ENCOUNTER — TELEPHONE (OUTPATIENT)
Dept: OBSTETRICS AND GYNECOLOGY | Facility: CLINIC | Age: 58
End: 2023-01-10
Payer: COMMERCIAL

## 2023-01-10 NOTE — TELEPHONE ENCOUNTER
Pt notified to change dose to morning time. Pt voices understanding that she will take medication in the morning.

## 2023-01-10 NOTE — TELEPHONE ENCOUNTER
Pt called office stating that she took elavil at night like she was instructed. Pt states that she was wide awake and struggled the next day at work. Does pt need to try different medication or can she take in the morning since it is keeping her up?

## 2023-02-22 ENCOUNTER — OFFICE VISIT (OUTPATIENT)
Dept: SLEEP MEDICINE | Facility: HOSPITAL | Age: 58
End: 2023-02-22
Payer: COMMERCIAL

## 2023-02-22 VITALS
HEART RATE: 76 BPM | HEIGHT: 65 IN | BODY MASS INDEX: 28.32 KG/M2 | SYSTOLIC BLOOD PRESSURE: 149 MMHG | OXYGEN SATURATION: 96 % | WEIGHT: 170 LBS | DIASTOLIC BLOOD PRESSURE: 77 MMHG

## 2023-02-22 DIAGNOSIS — G47.33 OBSTRUCTIVE SLEEP APNEA: Primary | ICD-10-CM

## 2023-02-22 DIAGNOSIS — R35.1 NOCTURIA: ICD-10-CM

## 2023-02-22 DIAGNOSIS — G47.19 EXCESSIVE DAYTIME SLEEPINESS: ICD-10-CM

## 2023-02-22 PROCEDURE — 99214 OFFICE O/P EST MOD 30 MIN: CPT | Performed by: NURSE PRACTITIONER

## 2023-02-22 RX ORDER — MONTELUKAST SODIUM 10 MG/1
TABLET ORAL
COMMUNITY
Start: 2023-01-21

## 2023-02-22 RX ORDER — AMITRIPTYLINE HYDROCHLORIDE 10 MG/1
TABLET, FILM COATED ORAL
COMMUNITY
Start: 2023-02-13

## 2023-02-22 NOTE — PROGRESS NOTES
Sleep Clinic Follow Up    Date: 2023  Primary Care Provider: Freya Bourne MD    Last office visit: 2022 (I reviewed this note)    CC: Follow up: SHARITA on CPAP      Interim History:  Since the last visit:    1) moderate SHARITA -  Sherly Jones has remained compliant with CPAP. She denies mask and machine issues, dry mouth, headaches, or pressures intolerance. She denies abnormal dreams, sleep paralysis, nasal congestion, URI sx. She is still struggling with noticing benefit from therapy and feels tired most days. She also reports awakening many mornings with headaches and dizziness.   I have recommended an overnight oximetry x 1 night while on CPAP to assess for nocturnal hypoxia.   Patient is possibly interested in Inspire therapy as she is becoming frustrated with PAP therapy over time. She is not getting optimal benefit from PAP usage and will consider Inspire.    Sleep Testin. PSG on 2010, AHI of 23.4   2. CPAP titration on 2010, recommended 12 cm H2O   3. PSG on 2013 with ProVent, AHI of 9.2 - started on autoBiPAP at some point  4. Repeat PAP titration on 2019, recommended 14 cm H2O  5. HST , unsure of AHI but still mild SHARITA  6. Currently on 9 cm H2O    PAP Data:    Time frame: 2022-2023   Compliance: 100%  Average use on days used: 6 hrs 43 min  Percent of days with usage greater than or equal to 4 hours: 93.9%  PAP range: 13 cm H2O  Average 90% pressure: 9 cmH2O  Leak: 2 minutes  Average AHI: 7.2 events/hr  Mask type: Nasal pillows  DME: [facility]  Machine type: [employer] DreamStation 2    Bed time: 2200  Sleep latency: 5 minutes  Number of times awakens during the night: 6-7  Wake time: 0600  Estimated total sleep time at night: 6-8 hours  Caffeine intake: 2 cups of coffee, 0 cups of tea, and 0 sodas per day  Alcohol intake: 0 drinks per week  Nap time: rare   Sleepiness with Driving: rare     [place] - 12  [place] Sleepiness  Scale  Sitting and reading: Moderate chance of dozing  Watching TV: Slight chance of dozing  Sitting, inactive in a public place (e.g. a theatre or a meeting): Slight chance of dozing  As a passenger in a car for an hour without a break: Moderate chance of dozing  Lying down to rest in the afternoon when circumstances permit: High chance of dozing  Sitting and talking to someone: Slight chance of dozing  Sitting quietly after a lunch without alcohol: Moderate chance of dozing  In a car, while stopped for a few minutes in traffic: Would never doze  Total score: 12    PMHx, FH, SH reviewed and pertinent changes are: Reportedly unchanged from last office visit with us.      Review of Systems:   Negative for chest pain, SOA, fever, chills, cough, N/V/D, abdominal pain.    Smoking:vaping    Sherly Jones  reports that she has been smoking electronic cigarette. She has never used smokeless tobacco.    Physical Exam:  Vitals:    02/22/23 1558   BP: 149/77   Pulse: 76   SpO2: 96%           02/22/23  1558   Weight: 77.1 kg (170 lb)     Body mass index is 28.29 kg/m². BMI is >= 25 and <30. (Overweight) The following options were offered after discussion;: referral to primary care    Gen:                No distress, conversant, pleasant, appears stated age, alert, oriented  Eyes:               Anicteric sclera, moist conjunctiva, no lid lag                           PERRL, EOMI   Heent:             NC/AT                          Oropharynx clear                          Normal hearing  Lungs:             Normal effort, non-labored breathing       CV:                  Normal S1/S2                          No lower extremity edema  ABD:               Soft, rounded, non-distended                  Psych:             Appropriate affect  Neuro:             CN 2-12 appear intact    Past Medical History:   Diagnosis Date   • Asthma     exercised enduced asthma   • Bigeminy    • Cervical cancer (HCC)    • Herpes genitalis    •  Pelvic pain    • Sleep apnea     cpap   • Stroke (HCC)     at age 21       Current Outpatient Medications:   •  acyclovir (ZOVIRAX) 400 MG tablet, Take 400 mg by mouth Daily., Disp: , Rfl:   •  albuterol sulfate  (90 Base) MCG/ACT inhaler, Inhale 1 puff As Needed., Disp: , Rfl:   •  buPROPion SR (WELLBUTRIN SR) 150 MG 12 hr tablet, Take 150 mg by mouth 2 (Two) Times a Day., Disp: , Rfl:   •  citalopram (CeleXA) 40 MG tablet, Take 40 mg by mouth Daily., Disp: , Rfl:   •  fluticasone (FLONASE) 50 MCG/ACT nasal spray, , Disp: , Rfl:   •  ipratropium-albuterol (COMBIVENT RESPIMAT)  MCG/ACT inhaler, Inhale 1 puff 4 (Four) Times a Day As Needed for Wheezing., Disp: , Rfl:   •  Probiotic Product (PROBIOTIC DAILY PO), Take  by mouth Daily., Disp: , Rfl:   •  amitriptyline (ELAVIL) 10 MG tablet, TAKE 1 TABLET BY MOUTH ONCE DAILY AT NIGHT FOR 30 DAYS, Disp: , Rfl:   •  montelukast (SINGULAIR) 10 MG tablet, , Disp: , Rfl:     WBC   Date Value Ref Range Status   06/09/2020 7.65 3.40 - 10.80 10*3/mm3 Final     RBC   Date Value Ref Range Status   06/09/2020 4.56 3.77 - 5.28 10*6/mm3 Final     Hemoglobin   Date Value Ref Range Status   06/09/2020 12.8 12.0 - 15.9 g/dL Final     Hematocrit   Date Value Ref Range Status   06/09/2020 40.2 34.0 - 46.6 % Final     MCV   Date Value Ref Range Status   06/09/2020 88.2 79.0 - 97.0 fL Final     MCH   Date Value Ref Range Status   06/09/2020 28.1 26.6 - 33.0 pg Final     MCHC   Date Value Ref Range Status   06/09/2020 31.8 31.5 - 35.7 g/dL Final     RDW   Date Value Ref Range Status   06/09/2020 13.8 12.3 - 15.4 % Final     RDW-SD   Date Value Ref Range Status   06/09/2020 44.7 37.0 - 54.0 fl Final     MPV   Date Value Ref Range Status   06/09/2020 10.3 6.0 - 12.0 fL Final     Platelets   Date Value Ref Range Status   06/09/2020 261 140 - 450 10*3/mm3 Final     Neutrophil %   Date Value Ref Range Status   11/19/2019 58.5 42.7 - 76.0 % Final     Lymphocyte %   Date Value Ref  Range Status   11/19/2019 30.9 19.6 - 45.3 % Final     Monocyte %   Date Value Ref Range Status   11/19/2019 6.9 5.0 - 12.0 % Final     Eosinophil %   Date Value Ref Range Status   11/19/2019 2.7 0.3 - 6.2 % Final     Basophil %   Date Value Ref Range Status   11/19/2019 0.6 0.0 - 1.5 % Final     Immature Grans %   Date Value Ref Range Status   11/19/2019 0.4 0.0 - 0.5 % Final     Neutrophils, Absolute   Date Value Ref Range Status   11/19/2019 6.16 1.70 - 7.00 10*3/mm3 Final     Lymphocytes, Absolute   Date Value Ref Range Status   11/19/2019 3.25 (H) 0.70 - 3.10 10*3/mm3 Final     Monocytes, Absolute   Date Value Ref Range Status   11/19/2019 0.72 0.10 - 0.90 10*3/mm3 Final     Eosinophils, Absolute   Date Value Ref Range Status   11/19/2019 0.28 0.00 - 0.40 10*3/mm3 Final     Basophils, Absolute   Date Value Ref Range Status   11/19/2019 0.06 0.00 - 0.20 10*3/mm3 Final     Immature Grans, Absolute   Date Value Ref Range Status   11/19/2019 0.04 0.00 - 0.05 10*3/mm3 Final     nRBC   Date Value Ref Range Status   11/19/2019 0.0 0.0 - 0.2 /100 WBC Final       Lab Results   Component Value Date    GLUCOSE 111 (H) 06/09/2020    BUN 15 06/09/2020    CREATININE 0.66 06/09/2020    BCR 22.7 06/09/2020    K 4.0 06/09/2020    CO2 28.0 06/09/2020    CALCIUM 9.8 06/09/2020    ALBUMIN 4.40 11/19/2019    AST 19 11/19/2019    ALT 21 11/19/2019       Assessment and Plan:    1. Obstructive sleep apnea - Established, stable (1)  1. Compliant with PAP therapy  2. Continue PAP as prescribed  3. Script for PAP supplies  4. Patient will call if she is interested in pursuing further workup for qualification of Inspire therapy  5. Pertinent labs were reviewed as listed above  6. Return to clinic in 1 year with compliance report unless change in symptoms in interim period  2. Daytime sleepiness - Established, not controlled  1. Check overnight oximetry x 1 night while on CPAP - follow up with results (patient has available equipment to do  this testing and will fax results to me)      I spent 30 minutes caring for Sherly on this date of service. This time includes time spent by me in the following activities: preparing for the visit, reviewing tests, obtaining and/or reviewing a separately obtained history, performing a medically appropriate examination and/or evaluation , counseling and educating the patient/family/caregiver, ordering medications, tests, or procedures, documenting information in the medical record and care coordination; discussing PAP therapy, PAP compliance, PAP maintenance and Further testing    RTC in 12 months. Patient agrees to return sooner if changes in symptoms.        This document has been electronically signed by KRYSTINA Elizalde on February 23, 2023 16:12 CST          CC: Freya Bourne MD          No ref. provider found

## 2024-12-19 ENCOUNTER — OFFICE VISIT (OUTPATIENT)
Dept: NEUROLOGY | Facility: CLINIC | Age: 59
End: 2024-12-19
Payer: COMMERCIAL

## 2024-12-19 ENCOUNTER — LAB (OUTPATIENT)
Dept: LAB | Facility: HOSPITAL | Age: 59
End: 2024-12-19
Payer: COMMERCIAL

## 2024-12-19 VITALS
HEART RATE: 78 BPM | DIASTOLIC BLOOD PRESSURE: 80 MMHG | HEIGHT: 65 IN | SYSTOLIC BLOOD PRESSURE: 142 MMHG | BODY MASS INDEX: 24.16 KG/M2 | WEIGHT: 145 LBS | OXYGEN SATURATION: 98 %

## 2024-12-19 DIAGNOSIS — R41.3 MEMORY LOSS: ICD-10-CM

## 2024-12-19 DIAGNOSIS — R41.3 MEMORY LOSS: Primary | ICD-10-CM

## 2024-12-19 LAB
ALBUMIN SERPL-MCNC: 4.2 G/DL (ref 3.5–5.2)
ALBUMIN/GLOB SERPL: 1.4 G/DL
ALP SERPL-CCNC: 74 U/L (ref 39–117)
ALT SERPL W P-5'-P-CCNC: 18 U/L (ref 1–33)
AMMONIA BLD-SCNC: 11 UMOL/L (ref 11–51)
ANION GAP SERPL CALCULATED.3IONS-SCNC: 10 MMOL/L (ref 5–15)
AST SERPL-CCNC: 18 U/L (ref 1–32)
BASOPHILS # BLD AUTO: 0.07 10*3/MM3 (ref 0–0.2)
BASOPHILS NFR BLD AUTO: 1.2 % (ref 0–1.5)
BILIRUB SERPL-MCNC: 0.7 MG/DL (ref 0–1.2)
BUN SERPL-MCNC: 7 MG/DL (ref 6–20)
BUN/CREAT SERPL: 12.3 (ref 7–25)
CALCIUM SPEC-SCNC: 9.5 MG/DL (ref 8.6–10.5)
CHLORIDE SERPL-SCNC: 105 MMOL/L (ref 98–107)
CO2 SERPL-SCNC: 30 MMOL/L (ref 22–29)
CREAT SERPL-MCNC: 0.57 MG/DL (ref 0.57–1)
DEPRECATED RDW RBC AUTO: 47.3 FL (ref 37–54)
EGFRCR SERPLBLD CKD-EPI 2021: 104.8 ML/MIN/1.73
EOSINOPHIL # BLD AUTO: 0.34 10*3/MM3 (ref 0–0.4)
EOSINOPHIL NFR BLD AUTO: 5.9 % (ref 0.3–6.2)
ERYTHROCYTE [DISTWIDTH] IN BLOOD BY AUTOMATED COUNT: 13.9 % (ref 12.3–15.4)
FOLATE SERPL-MCNC: 10.3 NG/ML (ref 4.78–24.2)
GLOBULIN UR ELPH-MCNC: 3 GM/DL
GLUCOSE SERPL-MCNC: 81 MG/DL (ref 65–99)
HBA1C MFR BLD: 5.4 % (ref 4.8–5.6)
HCT VFR BLD AUTO: 41.6 % (ref 34–46.6)
HGB BLD-MCNC: 12.9 G/DL (ref 12–15.9)
IMM GRANULOCYTES # BLD AUTO: 0.02 10*3/MM3 (ref 0–0.05)
IMM GRANULOCYTES NFR BLD AUTO: 0.3 % (ref 0–0.5)
LYMPHOCYTES # BLD AUTO: 2.24 10*3/MM3 (ref 0.7–3.1)
LYMPHOCYTES NFR BLD AUTO: 38.6 % (ref 19.6–45.3)
MCH RBC QN AUTO: 28.4 PG (ref 26.6–33)
MCHC RBC AUTO-ENTMCNC: 31 G/DL (ref 31.5–35.7)
MCV RBC AUTO: 91.6 FL (ref 79–97)
MONOCYTES # BLD AUTO: 0.39 10*3/MM3 (ref 0.1–0.9)
MONOCYTES NFR BLD AUTO: 6.7 % (ref 5–12)
NEUTROPHILS NFR BLD AUTO: 2.74 10*3/MM3 (ref 1.7–7)
NEUTROPHILS NFR BLD AUTO: 47.3 % (ref 42.7–76)
NRBC BLD AUTO-RTO: 0 /100 WBC (ref 0–0.2)
PLATELET # BLD AUTO: 284 10*3/MM3 (ref 140–450)
PMV BLD AUTO: 9.3 FL (ref 6–12)
POTASSIUM SERPL-SCNC: 4.3 MMOL/L (ref 3.5–5.2)
PROT SERPL-MCNC: 7.2 G/DL (ref 6–8.5)
RBC # BLD AUTO: 4.54 10*6/MM3 (ref 3.77–5.28)
SODIUM SERPL-SCNC: 145 MMOL/L (ref 136–145)
T4 FREE SERPL-MCNC: 0.97 NG/DL (ref 0.93–1.7)
TSH SERPL DL<=0.05 MIU/L-ACNC: 1.13 UIU/ML (ref 0.27–4.2)
VIT B12 BLD-MCNC: 699 PG/ML (ref 211–946)
WBC NRBC COR # BLD AUTO: 5.8 10*3/MM3 (ref 3.4–10.8)

## 2024-12-19 PROCEDURE — 82607 VITAMIN B-12: CPT

## 2024-12-19 PROCEDURE — 83036 HEMOGLOBIN GLYCOSYLATED A1C: CPT

## 2024-12-19 PROCEDURE — 82140 ASSAY OF AMMONIA: CPT

## 2024-12-19 PROCEDURE — 84439 ASSAY OF FREE THYROXINE: CPT

## 2024-12-19 PROCEDURE — 36415 COLL VENOUS BLD VENIPUNCTURE: CPT

## 2024-12-19 PROCEDURE — 80050 GENERAL HEALTH PANEL: CPT

## 2024-12-19 PROCEDURE — 82746 ASSAY OF FOLIC ACID SERUM: CPT

## 2024-12-19 RX ORDER — PANTOPRAZOLE SODIUM 40 MG/1
40 TABLET, DELAYED RELEASE ORAL DAILY
COMMUNITY
Start: 2024-09-28

## 2024-12-19 RX ORDER — FEXOFENADINE HCL 180 MG/1
180 TABLET ORAL DAILY
COMMUNITY
Start: 2024-10-01

## 2024-12-19 NOTE — PROGRESS NOTES
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  Patient notes that for the past 8 years that she has had slowly progressively noticed difficulty with multitasking difficulty with short-term memory previous memory appears to be well intact.  She notes that her  starting to finish her sentences that she starting to have some difficulty with grabbing for the right words.  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 she is also forgetting to do certain task while multitasking things etc.  She does have a history of depression anxiety.  She does have a history of previously having difficulty with sleep apnea as well as central apnea.  She is not well-controlled with her CPAP at this time.  Her sleep medicine physician is also perplexed at how to better titrate her CPAP machine.  Several years ago when she was having this developed difficulty she noticed a lot of executive dysfunction and difficulty with short-term memory and was this was better treated and her sleep was improved she had significant improvement in her cognition.  On the Mini-Mental status exam she scored 29/30.  On PHQ-9 she scored an 11 and on MARIBELL-7 she scored 7.  Overall concern is more likely a pseudodementia either secondary to psychosocial stressors and underlying depression anxiety and/or contributions from sleep deprivation in the setting of untreated/poorly treated SHARITA.  Patient has noted to decline and this has been several years so cannot rule out an underlying neurodegenerative disorder though at this time is felt less likely.  Will need to tease these things out further with neuropsychological  evaluation.  Of note patient had an MRI brain with and without contrast 11/19/2024 which was unremarkable.    Medications:  Current Outpatient Medications   Medication Sig Dispense Refill    acyclovir (ZOVIRAX) 400 MG tablet Take 1 tablet by mouth Daily.      albuterol sulfate  (90 Base) MCG/ACT inhaler Inhale 1 puff As Needed.      AMITRIPTYLINE 25MG/5ML SUSP Take 5 mL by mouth Every Night.      aspirin 81 MG oral suspension Take 81 mL by mouth Daily.      buPROPion SR (WELLBUTRIN SR) 150 MG 12 hr tablet Take 1 tablet by mouth 2 (Two) Times a Day.      citalopram (CeleXA) 40 MG tablet Take 1 tablet by mouth Daily.      fexofenadine (Allegra Allergy) 180 MG tablet Take 1 tablet by mouth Daily.      fluticasone (FLONASE) 50 MCG/ACT nasal spray       ipratropium-albuterol (COMBIVENT RESPIMAT)  MCG/ACT inhaler Inhale 1 puff 4 (Four) Times a Day As Needed for Wheezing.      montelukast (SINGULAIR) 10 MG tablet       pantoprazole (PROTONIX) 40 MG EC tablet Take 1 tablet by mouth Daily.      Probiotic Product (PROBIOTIC DAILY PO) Take  by mouth Daily.       No current facility-administered medications for this visit.       Review of Systems:   -A 14 point review of systems is completed and is negative except for sleep issues.      Objective      Vital Signs  Heart Rate:  [78] 78  BP: (142)/(80) 142/80    Neurological examination:  Higher Integrative  Function: Oriented to time, place and person. Normal registration, recall, attention span  and concentration. Normal language .  CN II: Pupils are equal, round, and reactive to light. Blinks to visual threat and counts finger   in all fields  CN III IV VI: Extraocular movements are full without nystagmus.   CN V: Normal facial sensation   CN VII: Facial movements are symmetric. No labial dysarthria  CN VIII: Auditory acuity is normal.  CN IX & X: No palatal or pharnygeal dysarthria.  CN XI: Turns head without abnormality.   CN XII: The tongue is midline.  No  lingual dysarthria.   Motor: Normal muscle strength, bulk and tone in upper and lower extremities.  No  fasciculations, rigidity, spasticity, or abnormal movements.  Reflexes: 2+ in the upper and lower extremities.   Sensation: Normal to light touch in arms and legs.   Station and Gait: Normal station and gait, normal tandem gait.  Coordination: Finger-to-nose test shows no dysmetria.     Assessment/Plan     Hospital Problem List      * No active hospital problems. *      Assessment/Plan:  Patient is a 59-year-old female with a past history of heavy alcohol use and drug abuse who has been in remission for 30 years who presents for evaluation of memory loss.      -MRI brain w/wo 11/19/2024 unremarkable   -EEG   -B12, folate, TSH, free T4, ammonia, A1c, CMP, CBCD   -Neuropsychological evaluation   -Referral to sleep medicine   -Follow-up in neurology clinic in 3 months       Verna Larsen MD  12/19/24  10:10 CST

## 2025-01-03 ENCOUNTER — TELEPHONE (OUTPATIENT)
Dept: NEUROLOGY | Facility: CLINIC | Age: 60
End: 2025-01-03
Payer: COMMERCIAL

## 2025-01-03 NOTE — TELEPHONE ENCOUNTER
----- Message from Verna Larsen sent at 12/30/2024  8:43 AM CST -----  Could you please call the patient and let them know that all labs look fine.

## 2025-03-12 ENCOUNTER — TELEPHONE (OUTPATIENT)
Dept: NEUROLOGY | Facility: CLINIC | Age: 60
End: 2025-03-12

## 2025-03-12 NOTE — TELEPHONE ENCOUNTER
"    Caller: Sherly Jones \"Jordyn\"    Relationship: Self    Best call back number: [phone]      What orders are you requesting (i.e. lab or imaging): EEG    In what timeframe would the patient need to come in: ASAP    Where will you receive your lab/imaging services: [facility] IN [place], [place]    Additional notes: DR. PARMAR ORDERED EEG AT PT'S LOV ON 12/19 TO BE DONE AT [facility], HOWEVER PT WOULD LIKE TO HAVE EEG DONE WHERE SHE WORKS AT [facility] IN [place], [place].    PLEASE SEND ORDERS TO FAX: [phone]      PLEASE REVIEW AND ADVISE          "

## 2025-03-25 ENCOUNTER — TELEPHONE (OUTPATIENT)
Dept: NEUROLOGY | Facility: CLINIC | Age: 60
End: 2025-03-25
Payer: COMMERCIAL

## 2025-03-25 DIAGNOSIS — G47.33 OSA TREATED WITH BIPAP: Primary | ICD-10-CM

## 2025-03-25 NOTE — TELEPHONE ENCOUNTER
"  Caller: Sherly Jones \"Estellay\"    Relationship: Self    Best call back number: [phone] (home) [phone] (work)    What is the medical concern/diagnosis:   SLEEP    What is the provider, practice or medical service name: DR NIHARIKA GALLAGHER    What is the office location:   [address], [address]  [place], [place]     What is the office phone number:    [phone]   [phone]    Any additional details:   PT WASN'T CONTACTED BY DR BONILLA TO SCHEDULE APPT SO PT IS ASKING FOR A REFERRAL TO BE SENT TO DR GALLAGHER.    PLEASE SEND A MY CHART MESSAGE WHEN THE REFERRAL HAS BEEN SENT.     "

## 2025-04-04 DIAGNOSIS — R41.3 MEMORY LOSS: ICD-10-CM

## 2025-05-19 ENCOUNTER — OFFICE VISIT (OUTPATIENT)
Dept: NEUROLOGY | Facility: CLINIC | Age: 60
End: 2025-05-19
Payer: COMMERCIAL

## 2025-05-19 VITALS
OXYGEN SATURATION: 99 % | HEART RATE: 68 BPM | SYSTOLIC BLOOD PRESSURE: 122 MMHG | BODY MASS INDEX: 26.79 KG/M2 | HEIGHT: 65 IN | WEIGHT: 160.8 LBS | DIASTOLIC BLOOD PRESSURE: 80 MMHG

## 2025-05-19 DIAGNOSIS — G47.33 OSA TREATED WITH BIPAP: ICD-10-CM

## 2025-05-19 DIAGNOSIS — Z71.1 CONCERN ABOUT MEMORY: Primary | ICD-10-CM

## 2025-05-19 RX ORDER — OXYBUTYNIN CHLORIDE 10 MG/1
1 TABLET, EXTENDED RELEASE ORAL DAILY
COMMUNITY
Start: 2025-02-18 | End: 2026-02-13

## 2025-05-19 RX ORDER — ESTRADIOL 0.1 MG/G
CREAM VAGINAL
COMMUNITY

## (undated) DEVICE — GLV SURG BIOGEL M LTX PF 7 1/2

## (undated) DEVICE — TBG DRN URINARY 9/32IN

## (undated) DEVICE — PK CYSTO 30

## (undated) DEVICE — PK TURNOVER CYSTO RM